# Patient Record
Sex: FEMALE | Race: WHITE | Employment: FULL TIME | URBAN - METROPOLITAN AREA
[De-identification: names, ages, dates, MRNs, and addresses within clinical notes are randomized per-mention and may not be internally consistent; named-entity substitution may affect disease eponyms.]

---

## 2022-02-10 LAB
ANTIBODY SCREEN, EXTERNAL: NEGATIVE
CHLAMYDIA, EXTERNAL: NEGATIVE
GTT, FASTING, EXTERNAL: NORMAL
HBSAG, EXTERNAL: NEGATIVE
HGB, EXTERNAL: 13.9
HIV, EXTERNAL: NEGATIVE
N. GONORRHEA, EXTERNAL: NEGATIVE
NIPT, EXTERNAL: NORMAL
PAP SMEAR, EXTERNAL: NORMAL
RPR, EXTERNAL: NON REACTIVE
RUBELLA, EXTERNAL: NORMAL
TSH SERPL-ACNC: NORMAL M[IU]/L
TYPE, ABO & RH, EXTERNAL: NORMAL

## 2022-03-13 PROBLEM — E55.9 VITAMIN D DEFICIENCY: Status: ACTIVE | Noted: 2022-02-10

## 2022-03-15 ENCOUNTER — INITIAL PRENATAL (OUTPATIENT)
Dept: OBGYN CLINIC | Age: 32
End: 2022-03-15
Payer: COMMERCIAL

## 2022-03-15 VITALS — WEIGHT: 187.2 LBS | BODY MASS INDEX: 30.21 KG/M2 | DIASTOLIC BLOOD PRESSURE: 82 MMHG | SYSTOLIC BLOOD PRESSURE: 128 MMHG

## 2022-03-15 DIAGNOSIS — Z3A.16 PREGNANCY WITH 16 COMPLETED WEEKS GESTATION: ICD-10-CM

## 2022-03-15 DIAGNOSIS — Z34.90 ENCOUNTER FOR SUPERVISION OF NORMAL PREGNANCY, ANTEPARTUM, UNSPECIFIED GRAVIDITY: Primary | ICD-10-CM

## 2022-03-15 PROCEDURE — 0502F SUBSEQUENT PRENATAL CARE: CPT | Performed by: OBSTETRICS & GYNECOLOGY

## 2022-03-15 NOTE — PROGRESS NOTES
Doing well . Tx from [address] [place] [address]. No complaints.   Fetal moveent  Problems  Dating by LMP=1st Trimester US  Vitamin D Def  History of Gestation HTN G1  Boy -- Anjel Hooper

## 2022-03-17 LAB
AFP INTERP SERPL-IMP: NORMAL
AFP INTERP SERPL-IMP: NORMAL
AFP MOM SERPL: 1.16
AFP SERPL-MCNC: 34 NG/ML
AGE AT DELIVERY: 32.3 YR
COMMENT, 018013: NORMAL
GA METHOD: NORMAL
GA: 16 WEEKS
IDDM PATIENT QL: NORMAL
MULTIPLE PREGNANCY: NO
NEURAL TUBE DEFECT RISK FETUS: 7366 %
RESULTS, 017004: NORMAL
SPECIMEN STATUS REPORT, ROLRST: NORMAL

## 2022-03-19 PROBLEM — E55.9 VITAMIN D DEFICIENCY: Status: ACTIVE | Noted: 2022-02-10

## 2022-04-05 ENCOUNTER — ROUTINE PRENATAL (OUTPATIENT)
Dept: OBGYN CLINIC | Age: 32
End: 2022-04-05

## 2022-04-05 VITALS — WEIGHT: 185.4 LBS | SYSTOLIC BLOOD PRESSURE: 110 MMHG | DIASTOLIC BLOOD PRESSURE: 70 MMHG | BODY MASS INDEX: 29.92 KG/M2

## 2022-04-05 DIAGNOSIS — Z3A.19 PREGNANCY WITH 19 COMPLETED WEEKS GESTATION: Primary | ICD-10-CM

## 2022-04-05 PROCEDURE — 0502F SUBSEQUENT PRENATAL CARE: CPT | Performed by: OBSTETRICS & GYNECOLOGY

## 2022-04-05 RX ORDER — ONDANSETRON 8 MG/1
TABLET, ORALLY DISINTEGRATING ORAL
COMMUNITY
Start: 2022-02-10 | End: 2022-06-16 | Stop reason: ALTCHOICE

## 2022-04-05 RX ORDER — PROMETHAZINE HYDROCHLORIDE 25 MG/1
TABLET ORAL
COMMUNITY
Start: 2022-01-17 | End: 2022-06-16 | Stop reason: ALTCHOICE

## 2022-04-05 NOTE — PROGRESS NOTES
Has lost 9.6 lb  US today growth 85% normal fetal scan  Still throwing UP lots of nausea. Taking   Promethazine/Zofran    Problems  Dating by LMP=1st Trimester US  Vitamin D Def  History of Gestation HTN G1 take Baby ASA  Boy -- Laura Speak  US 19w6d = 20w4d 85% poor views of RVOT and Foot ? EDISON renae 28w

## 2022-04-05 NOTE — PROGRESS NOTES
FETAL SURVEY  A SINGLE VIABLE IUP AT 19W6D GA BY LMP IS SEEN. FETAL CARDIAC MOTION OBSERVED. FETAL ANATOMY WELL VISUALIZED. FACE, NOSE/LIPS, PROFILE, CSP, LAT VENT, CER/CM, CP, SPINE, KIDNEYS, STOMACH/DIAPHRAGM, BLADDER,  CORD INSERTION, LVOT, 4CH, AO, DA, 3VV, ARMS, LEGS, HANDS, FEET. ANATOMY NOT SEEN: RVOT, FEET  THERE APPEARS TO BE A SLUA. 3VC IS NOT SEEN. APPROPRIATE FETAL GROWTH IS SEEN. SIZE=DATES. VIRI, CERVIX AND PLACENTA APPEAR WITHIN NORMAL LIMITS.   GENDER: XY

## 2022-04-18 ENCOUNTER — HOSPITAL ENCOUNTER (EMERGENCY)
Age: 32
Discharge: HOME | End: 2022-04-18
Attending: STUDENT IN AN ORGANIZED HEALTH CARE EDUCATION/TRAINING PROGRAM
Payer: COMMERCIAL

## 2022-04-18 ENCOUNTER — TELEPHONE (OUTPATIENT)
Dept: OBGYN CLINIC | Age: 32
End: 2022-04-18

## 2022-04-18 VITALS
HEIGHT: 67 IN | DIASTOLIC BLOOD PRESSURE: 71 MMHG | RESPIRATION RATE: 18 BRPM | HEART RATE: 111 BPM | OXYGEN SATURATION: 100 % | WEIGHT: 185 LBS | TEMPERATURE: 97.6 F | BODY MASS INDEX: 29.03 KG/M2 | SYSTOLIC BLOOD PRESSURE: 108 MMHG

## 2022-04-18 DIAGNOSIS — G43.019 INTRACTABLE MIGRAINE WITHOUT AURA AND WITHOUT STATUS MIGRAINOSUS: Primary | ICD-10-CM

## 2022-04-18 DIAGNOSIS — R51.9 ACUTE NONINTRACTABLE HEADACHE, UNSPECIFIED HEADACHE TYPE: Primary | ICD-10-CM

## 2022-04-18 LAB
ALBUMIN SERPL-MCNC: 2.5 G/DL (ref 3.5–5)
ALBUMIN/GLOB SERPL: 0.6 {RATIO} (ref 1.1–2.2)
ALP SERPL-CCNC: 99 U/L (ref 45–117)
ALT SERPL-CCNC: 12 U/L (ref 12–78)
ANION GAP SERPL CALC-SCNC: 4 MMOL/L (ref 5–15)
AST SERPL-CCNC: 11 U/L (ref 15–37)
BASOPHILS # BLD: 0 K/UL (ref 0–0.1)
BASOPHILS NFR BLD: 0 % (ref 0–1)
BILIRUB SERPL-MCNC: 0.2 MG/DL (ref 0.2–1)
BUN SERPL-MCNC: 7 MG/DL (ref 6–20)
BUN/CREAT SERPL: 10 (ref 12–20)
CALCIUM SERPL-MCNC: 8.8 MG/DL (ref 8.5–10.1)
CHLORIDE SERPL-SCNC: 109 MMOL/L (ref 97–108)
CO2 SERPL-SCNC: 23 MMOL/L (ref 21–32)
COMMENT, HOLDF: NORMAL
CREAT SERPL-MCNC: 0.69 MG/DL (ref 0.55–1.02)
DIFFERENTIAL METHOD BLD: ABNORMAL
EOSINOPHIL # BLD: 0.6 K/UL (ref 0–0.4)
EOSINOPHIL NFR BLD: 6 % (ref 0–7)
ERYTHROCYTE [DISTWIDTH] IN BLOOD BY AUTOMATED COUNT: 12.8 % (ref 11.5–14.5)
GLOBULIN SER CALC-MCNC: 4.4 G/DL (ref 2–4)
GLUCOSE SERPL-MCNC: 92 MG/DL (ref 65–100)
HCT VFR BLD AUTO: 35.1 % (ref 35–47)
HGB BLD-MCNC: 11.9 G/DL (ref 11.5–16)
IMM GRANULOCYTES # BLD AUTO: 0 K/UL (ref 0–0.04)
IMM GRANULOCYTES NFR BLD AUTO: 0 % (ref 0–0.5)
LYMPHOCYTES # BLD: 1.4 K/UL (ref 0.8–3.5)
LYMPHOCYTES NFR BLD: 14 % (ref 12–49)
MCH RBC QN AUTO: 29.5 PG (ref 26–34)
MCHC RBC AUTO-ENTMCNC: 33.9 G/DL (ref 30–36.5)
MCV RBC AUTO: 86.9 FL (ref 80–99)
MONOCYTES # BLD: 0.5 K/UL (ref 0–1)
MONOCYTES NFR BLD: 5 % (ref 5–13)
NEUTS SEG # BLD: 7.6 K/UL (ref 1.8–8)
NEUTS SEG NFR BLD: 75 % (ref 32–75)
NRBC # BLD: 0 K/UL (ref 0–0.01)
NRBC BLD-RTO: 0 PER 100 WBC
PLATELET # BLD AUTO: 291 K/UL (ref 150–400)
PMV BLD AUTO: 9.6 FL (ref 8.9–12.9)
POTASSIUM SERPL-SCNC: 3.8 MMOL/L (ref 3.5–5.1)
PROT SERPL-MCNC: 6.9 G/DL (ref 6.4–8.2)
RBC # BLD AUTO: 4.04 M/UL (ref 3.8–5.2)
SAMPLES BEING HELD,HOLD: NORMAL
SODIUM SERPL-SCNC: 136 MMOL/L (ref 136–145)
WBC # BLD AUTO: 10.1 K/UL (ref 3.6–11)

## 2022-04-18 PROCEDURE — 99284 EMERGENCY DEPT VISIT MOD MDM: CPT

## 2022-04-18 PROCEDURE — 36415 COLL VENOUS BLD VENIPUNCTURE: CPT

## 2022-04-18 PROCEDURE — 80053 COMPREHEN METABOLIC PANEL: CPT

## 2022-04-18 PROCEDURE — 96374 THER/PROPH/DIAG INJ IV PUSH: CPT

## 2022-04-18 PROCEDURE — 85025 COMPLETE CBC W/AUTO DIFF WBC: CPT

## 2022-04-18 PROCEDURE — 74011250636 HC RX REV CODE- 250/636: Performed by: STUDENT IN AN ORGANIZED HEALTH CARE EDUCATION/TRAINING PROGRAM

## 2022-04-18 RX ORDER — METOCLOPRAMIDE HYDROCHLORIDE 5 MG/ML
10 INJECTION INTRAMUSCULAR; INTRAVENOUS
Status: COMPLETED | OUTPATIENT
Start: 2022-04-18 | End: 2022-04-18

## 2022-04-18 RX ORDER — BUTALBITAL, ACETAMINOPHEN AND CAFFEINE 50; 325; 40 MG/1; MG/1; MG/1
1 TABLET ORAL
Qty: 20 TABLET | Refills: 0 | Status: SHIPPED | OUTPATIENT
Start: 2022-04-18 | End: 2022-04-25

## 2022-04-18 RX ADMIN — METOCLOPRAMIDE 10 MG: 5 INJECTION, SOLUTION INTRAMUSCULAR; INTRAVENOUS at 07:57

## 2022-04-18 RX ADMIN — SODIUM CHLORIDE 1000 ML: 9 INJECTION, SOLUTION INTRAVENOUS at 07:57

## 2022-04-18 NOTE — TELEPHONE ENCOUNTER
Message  Received: Today  MD Sherrell Patel Derry Samuels, RN  Caller: Unspecified (Today, 11:36 AM)  I sent her some fioricette   Patient advised of MD sent prescription and to follow up if no improvements    Patient verbalized understanding.

## 2022-04-18 NOTE — ED PROVIDER NOTES
Chief Complaint   Patient presents with    Migraine     This is a 60-year-old female currently 21 weeks pregnant by last ultrasound, with a pattern of migraine headaches in the past, presenting with headache which she localizes to her left frontal and parietal area that started 3 days ago. She says she woke up with a headache, it feels like migraines she has had in the past but has lasted longer than usual.  Prior to her pregnancy she was taking sumatriptan with some relief. Feels nauseous and dizzy, endorses photophobia. Otherwise denies any loss of vision, neck pain, vomiting, speech deficit, lateralizing weakness or sensory changes in her extremities, and she has been walking without any difficulty. No antecedent head trauma. Denies any vaginal bleeding, leakage of fluid, or loss of fetal movement. She is being followed by [facility] and has not had any complications related to her pregnancy thus far. Using Tylenol and Benadryl at home without any relief, no medications taken today. Symptoms are moderate in nature without any alleviating factors.       Past Medical History:   Diagnosis Date    Anemia     Celiac disease     COVID-19 12/25/2021    PIH (pregnancy induced hypertension)     Vitamin D deficiency 02/10/2022    17.5       Past Surgical History:   Procedure Laterality Date    HX ENDOSCOPY      HX TONSILLECTOMY           Family History:   Problem Relation Age of Onset    Other Mother         Fibroids    Breast Cancer Maternal Grandmother     Ovarian Cancer Maternal Grandmother        Social History     Socioeconomic History    Marital status:      Spouse name: Not on file    Number of children: Not on file    Years of education: Not on file    Highest education level: Not on file   Occupational History    Occupation: Marketing   Tobacco Use    Smoking status: Never Smoker    Smokeless tobacco: Never Used   Vaping Use    Vaping Use: Never used   Substance and Sexual Activity    Alcohol use: Not Currently    Drug use: Never    Sexual activity: Yes     Partners: Male     Birth control/protection: None   Other Topics Concern    Not on file   Social History Narrative    Not on file     Social Determinants of Health     Financial Resource Strain:     Difficulty of Paying Living Expenses: Not on file   Food Insecurity:     Worried About Running Out of Food in the Last Year: Not on file    [place] of Food in the Last Year: Not on file   Transportation Needs:     Lack of Transportation (Medical): Not on file    Lack of Transportation (Non-Medical): Not on file   Physical Activity:     Days of Exercise per Week: Not on file    Minutes of Exercise per Session: Not on file   Stress:     Feeling of Stress : Not on file   Social Connections:     Frequency of Communication with Friends and Family: Not on file    Frequency of Social Gatherings with Friends and Family: Not on file    Attends [employer] Services: Not on file    Active Member of Clubs or Organizations: Not on file    Attends Club or Organization Meetings: Not on file    Marital Status: Not on file   Intimate Partner Violence:     Fear of Current or Ex-Partner: Not on file    Emotionally Abused: Not on file    Physically Abused: Not on file    Sexually Abused: Not on file   Housing Stability:     Unable to Pay for Housing in the Last Year: Not on file    Number of [place] in the Last Year: Not on file    Unstable Housing in the Last Year: Not on file         ALLERGIES: Gluten    Review of Systems   Constitutional: Negative for fever. HENT: Negative for ear pain. Eyes: Positive for photophobia. Respiratory: Negative for shortness of breath. Cardiovascular: Negative for chest pain. Gastrointestinal: Negative for abdominal pain and vomiting. Genitourinary: Negative for pelvic pain. Musculoskeletal: Negative for neck pain. Neurological: Positive for dizziness and headaches.  Negative for weakness and numbness. Psychiatric/Behavioral: Negative for confusion. Vitals:    04/18/22 0723   BP: 132/79   Pulse: (!) 111   Resp: 18   Temp: 97.6 °F (36.4 °C)   SpO2: 100%   Weight: 83.9 kg (185 lb)   Height: 5' 7\" (1.702 m)            Physical Exam  General:  Awake and alert, NAD  HEENT:  NC/AT, equal pupils, moist mucous membranes, TM's clear without bulging or erythema  Neck:   Normal inspection, full range of motion  Cardiac:  RRR, no murmurs  Respiratory:  Clear bilaterally, no wheezes, rales, rhonchi  Abdomen:  Soft and gravid, nontender  Extremities: Warm and well perfused, no peripheral edema  Neuro:  CN grossly intact, moving all extremities symmetrically without gross motor deficit, normal gait without ataxia  Skin:   No rashes or pallor    RESULTS  Recent Results (from the past 12 hour(s))   CBC WITH AUTOMATED DIFF    Collection Time: 04/18/22  7:56 AM   Result Value Ref Range    WBC 10.1 3.6 - 11.0 K/uL    RBC 4.04 3.80 - 5.20 M/uL    HGB 11.9 11.5 - 16.0 g/dL    HCT 35.1 35.0 - 47.0 %    MCV 86.9 80.0 - 99.0 FL    MCH 29.5 26.0 - 34.0 PG    MCHC 33.9 30.0 - 36.5 g/dL    RDW 12.8 11.5 - 14.5 %    PLATELET 410 883 - 472 K/uL    MPV 9.6 8.9 - 12.9 FL    NRBC 0.0 0  WBC    ABSOLUTE NRBC 0.00 0.00 - 0.01 K/uL    NEUTROPHILS 75 32 - 75 %    LYMPHOCYTES 14 12 - 49 %    MONOCYTES 5 5 - 13 %    EOSINOPHILS 6 0 - 7 %    BASOPHILS 0 0 - 1 %    IMMATURE GRANULOCYTES 0 0.0 - 0.5 %    ABS. NEUTROPHILS 7.6 1.8 - 8.0 K/UL    ABS. LYMPHOCYTES 1.4 0.8 - 3.5 K/UL    ABS. MONOCYTES 0.5 0.0 - 1.0 K/UL    ABS. EOSINOPHILS 0.6 (H) 0.0 - 0.4 K/UL    ABS. BASOPHILS 0.0 0.0 - 0.1 K/UL    ABS. IMM.  GRANS. 0.0 0.00 - 0.04 K/UL    DF AUTOMATED     METABOLIC PANEL, COMPREHENSIVE    Collection Time: 04/18/22  7:56 AM   Result Value Ref Range    Sodium 136 136 - 145 mmol/L    Potassium 3.8 3.5 - 5.1 mmol/L    Chloride 109 (H) 97 - 108 mmol/L    CO2 23 21 - 32 mmol/L    Anion gap 4 (L) 5 - 15 mmol/L    Glucose 92 65 - 100 mg/dL    BUN 7 6 - 20 MG/DL    Creatinine 0.69 0.55 - 1.02 MG/DL    BUN/Creatinine ratio 10 (L) 12 - 20      GFR est AA >60 >60 ml/min/1.73m2    GFR est non-AA >60 >60 ml/min/1.73m2    Calcium 8.8 8.5 - 10.1 MG/DL    Bilirubin, total 0.2 0.2 - 1.0 MG/DL    ALT (SGPT) 12 12 - 78 U/L    AST (SGOT) 11 (L) 15 - 37 U/L    Alk. phosphatase 99 45 - 117 U/L    Protein, total 6.9 6.4 - 8.2 g/dL    Albumin 2.5 (L) 3.5 - 5.0 g/dL    Globulin 4.4 (H) 2.0 - 4.0 g/dL    A-G Ratio 0.6 (L) 1.1 - 2.2     SAMPLES BEING HELD    Collection Time: 04/18/22  7:56 AM   Result Value Ref Range    SAMPLES BEING HELD 1SST,1 DRK GRN     COMMENT        Add-on orders for these samples will be processed based on acceptable specimen integrity and analyte stability, which may vary by analyte. IMAGING  No results found. Procedures - none unless documented below    ED course: Labs reviewed. . After receiving IV fluids and Reglan she's feeling much better, headache is resolving. Given hx and exam, reassessment, low suspicion at this moment in time for [facility], meningitis/encephalitis, cervical dissection, venous sinus thrombosis or other emergent intracranial pathology. Repeat BP after treatment is 108/71. She feels comfortable going home and following up with her obstetrician to be reevaluated. Strict return precautions communicated. The patient has been given verbal and written advice regarding today's presentation including reasons to re-attend, specifically signs and symptoms of concern or worsening condition were discussed and understood by the patient.      Impression: Acute headache  Disposition: Discharge home

## 2022-04-18 NOTE — DISCHARGE INSTRUCTIONS
- Continue Tylenol as needed for headache, drink lots of fluids throughout the day, eat regular meals, get adequate sleep  - Return immediately to the ER for severe or worsening headache, loss of vision, neck pain, vomiting, weakness in an arm or a leg, confusion or decreased responsiveness, dizziness, or difficulty walking.

## 2022-04-18 NOTE — TELEPHONE ENCOUNTER
Call received raymond 11:35am      32year old last seen in the office on  last seen in the office on 2022. Patient reports baby is doing well and moving and she is not having contractions.       Patient was seen in er today ( see notes)for migraine that started on Friday and was provided medication but the migraine is back    Pharmacy confirmed      Please advise    Thank you

## 2022-04-22 ENCOUNTER — TELEPHONE (OUTPATIENT)
Dept: OBGYN CLINIC | Age: 32
End: 2022-04-22

## 2022-04-22 NOTE — TELEPHONE ENCOUNTER
Call received at 9:00am      32year old  22w2d pregnant last seen in the office on 2022       GM patient       Patient denies vaginal bleeding,ROM, contractions and reports positive fetal movement      Patient calling to say that for the past two weeks she has had a dry cough, denies runny nose or fever    Patient reports the coughing is causing tightness in her abdomen and is wondering what she can take    Patient was advised she can take Robitussin plain or DM , patient reports she has tried that and it does not help    Patient was advised to increase po fluids and to seek evaluation at her PcP      Additional recommendations    Please advise    Thank you

## 2022-04-22 NOTE — TELEPHONE ENCOUNTER
Patient advised of work in MD recommendations and has tried the suggestions and is on the way to her PCP    Patient verbalized understanding.

## 2022-05-10 ENCOUNTER — ROUTINE PRENATAL (OUTPATIENT)
Dept: OBGYN CLINIC | Age: 32
End: 2022-05-10
Payer: COMMERCIAL

## 2022-05-10 VITALS — BODY MASS INDEX: 29.63 KG/M2 | SYSTOLIC BLOOD PRESSURE: 138 MMHG | DIASTOLIC BLOOD PRESSURE: 80 MMHG | WEIGHT: 189.2 LBS

## 2022-05-10 DIAGNOSIS — Z3A.24 24 WEEKS GESTATION OF PREGNANCY: Primary | ICD-10-CM

## 2022-05-10 PROCEDURE — 0502F SUBSEQUENT PRENATAL CARE: CPT | Performed by: OBSTETRICS & GYNECOLOGY

## 2022-05-10 NOTE — PROGRESS NOTES
Doing well No issues  Some elevated BP at home but no Sx Pre E. No Edema.        Problems  Dating by LMP=1st Trimester US  Vitamin D Def  History of Gestation HTN G1 taking Baby ASA  Boy -- Mercedes Arrant  US 19w6d = 20w4d 85% poor views of RVOT and Foot ? 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

## 2022-05-31 ENCOUNTER — ROUTINE PRENATAL (OUTPATIENT)
Dept: OBGYN CLINIC | Age: 32
End: 2022-05-31

## 2022-05-31 VITALS — SYSTOLIC BLOOD PRESSURE: 139 MMHG | WEIGHT: 192.2 LBS | BODY MASS INDEX: 30.1 KG/M2 | DIASTOLIC BLOOD PRESSURE: 93 MMHG

## 2022-05-31 DIAGNOSIS — Z3A.27 27 WEEKS GESTATION OF PREGNANCY: Primary | ICD-10-CM

## 2022-05-31 DIAGNOSIS — R03.0 ELEVATED BP WITHOUT DIAGNOSIS OF HYPERTENSION: ICD-10-CM

## 2022-05-31 DIAGNOSIS — Z23 ENCOUNTER FOR IMMUNIZATION: ICD-10-CM

## 2022-05-31 PROBLEM — Q27.0 SINGLE UMBILICAL ARTERY: Status: ACTIVE | Noted: 2022-05-31

## 2022-05-31 PROCEDURE — 90715 TDAP VACCINE 7 YRS/> IM: CPT | Performed by: OBSTETRICS & GYNECOLOGY

## 2022-05-31 PROCEDURE — 0502F SUBSEQUENT PRENATAL CARE: CPT | Performed by: OBSTETRICS & GYNECOLOGY

## 2022-05-31 PROCEDURE — 90471 IMMUNIZATION ADMIN: CPT | Performed by: OBSTETRICS & GYNECOLOGY

## 2022-05-31 NOTE — PROGRESS NOTES
After obtaining consent, and per orders of , injection of Tdap given by Kian Gabriel. Patient instructed to remain in clinic for 10 minutes afterwards, and to report any adverse reaction to me immediately.

## 2022-05-31 NOTE — PROGRESS NOTES
Doing well. BP noted. No specific sx Pre E. Checking BP at home: Occ high bp but usually 110/75  No swelling no HA. Rare scotoma. Still w occ nasuea still taking zofran occ. LIMITED OB SCAN  A SINGLE BREECH 27W6D IUP IS SEEN. FETAL CARDIAC MOTION OBSERVED. LIMITED ANATOMY WAS VISUALIZED. FEET AND RVOT APPEARS WNL. THE 2VC SEEN PREVIOUSLY APPEARS TO BE SEEN TODAY.  RECOMMENDED FOR FOLLOW UP.  APPROPRIATE FETAL GROWTH IS SEEN. SIZE=DATES. VIRI AND PLACENTA APPEAR WITHIN NORMAL LIMITS. Problems  Dating by LMP=1st Trimester US  Vitamin D Def  History of Gestation HTN G1 taking Baby ASA  Boy -- Myrene Salon  US 19w6d = 20w4d 85% poor views of RVOT and Foot ? SUA rescan 28w -- Has appt   US 27w6d = 27w5d 43% [place].   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

## 2022-06-01 LAB
25(OH)D3 SERPL-MCNC: 25.4 NG/ML (ref 30–100)
ALBUMIN SERPL-MCNC: 2.7 G/DL (ref 3.5–5)
ALBUMIN/GLOB SERPL: 0.7 {RATIO} (ref 1.1–2.2)
ALP SERPL-CCNC: 107 U/L (ref 45–117)
ALT SERPL-CCNC: 13 U/L (ref 12–78)
ANION GAP SERPL CALC-SCNC: 8 MMOL/L (ref 5–15)
AST SERPL-CCNC: 11 U/L (ref 15–37)
BILIRUB SERPL-MCNC: 0.1 MG/DL (ref 0.2–1)
BUN SERPL-MCNC: 7 MG/DL (ref 6–20)
BUN/CREAT SERPL: 11 (ref 12–20)
CALCIUM SERPL-MCNC: 9.1 MG/DL (ref 8.5–10.1)
CHLORIDE SERPL-SCNC: 106 MMOL/L (ref 97–108)
CO2 SERPL-SCNC: 22 MMOL/L (ref 21–32)
CREAT SERPL-MCNC: 0.62 MG/DL (ref 0.55–1.02)
ERYTHROCYTE [DISTWIDTH] IN BLOOD BY AUTOMATED COUNT: 13.5 % (ref 11.5–14.5)
GLOBULIN SER CALC-MCNC: 3.8 G/DL (ref 2–4)
GLUCOSE 1H P 100 G GLC PO SERPL-MCNC: 122 MG/DL (ref 65–140)
GLUCOSE SERPL-MCNC: 118 MG/DL (ref 65–100)
HCT VFR BLD AUTO: 38.2 % (ref 35–47)
HGB BLD-MCNC: 11.7 G/DL (ref 11.5–16)
MCH RBC QN AUTO: 29.3 PG (ref 26–34)
MCHC RBC AUTO-ENTMCNC: 30.6 G/DL (ref 30–36.5)
MCV RBC AUTO: 95.7 FL (ref 80–99)
NRBC # BLD: 0 K/UL (ref 0–0.01)
NRBC BLD-RTO: 0 PER 100 WBC
PLATELET # BLD AUTO: 315 K/UL (ref 150–400)
PMV BLD AUTO: 9.6 FL (ref 8.9–12.9)
POTASSIUM SERPL-SCNC: 3.8 MMOL/L (ref 3.5–5.1)
PROT SERPL-MCNC: 6.5 G/DL (ref 6.4–8.2)
RBC # BLD AUTO: 3.99 M/UL (ref 3.8–5.2)
SODIUM SERPL-SCNC: 136 MMOL/L (ref 136–145)
WBC # BLD AUTO: 7.7 K/UL (ref 3.6–11)

## 2022-06-05 ENCOUNTER — HOSPITAL ENCOUNTER (EMERGENCY)
Age: 32
Discharge: HOME | End: 2022-06-05
Attending: OBSTETRICS & GYNECOLOGY | Admitting: OBSTETRICS & GYNECOLOGY
Payer: COMMERCIAL

## 2022-06-05 VITALS
OXYGEN SATURATION: 98 % | TEMPERATURE: 98.4 F | SYSTOLIC BLOOD PRESSURE: 138 MMHG | DIASTOLIC BLOOD PRESSURE: 88 MMHG | HEART RATE: 94 BPM | RESPIRATION RATE: 16 BRPM

## 2022-06-05 LAB
ALBUMIN SERPL-MCNC: 2.5 G/DL (ref 3.5–5)
ALBUMIN/GLOB SERPL: 0.7 {RATIO} (ref 1.1–2.2)
ALP SERPL-CCNC: 104 U/L (ref 45–117)
ALT SERPL-CCNC: 13 U/L (ref 12–78)
ANION GAP SERPL CALC-SCNC: 8 MMOL/L (ref 5–15)
AST SERPL-CCNC: 9 U/L (ref 15–37)
BASOPHILS # BLD: 0 K/UL (ref 0–0.1)
BASOPHILS NFR BLD: 0 % (ref 0–1)
BILIRUB SERPL-MCNC: 0.2 MG/DL (ref 0.2–1)
BUN SERPL-MCNC: 8 MG/DL (ref 6–20)
BUN/CREAT SERPL: 12 (ref 12–20)
CALCIUM SERPL-MCNC: 8.9 MG/DL (ref 8.5–10.1)
CHLORIDE SERPL-SCNC: 108 MMOL/L (ref 97–108)
CO2 SERPL-SCNC: 22 MMOL/L (ref 21–32)
CREAT SERPL-MCNC: 0.69 MG/DL (ref 0.55–1.02)
CREAT UR-MCNC: <13 MG/DL
DIFFERENTIAL METHOD BLD: ABNORMAL
EOSINOPHIL # BLD: 0.4 K/UL (ref 0–0.4)
EOSINOPHIL NFR BLD: 4 % (ref 0–7)
ERYTHROCYTE [DISTWIDTH] IN BLOOD BY AUTOMATED COUNT: 13.1 % (ref 11.5–14.5)
GLOBULIN SER CALC-MCNC: 3.8 G/DL (ref 2–4)
GLUCOSE SERPL-MCNC: 90 MG/DL (ref 65–100)
HCT VFR BLD AUTO: 32.1 % (ref 35–47)
HGB BLD-MCNC: 11.1 G/DL (ref 11.5–16)
IMM GRANULOCYTES # BLD AUTO: 0 K/UL (ref 0–0.04)
IMM GRANULOCYTES NFR BLD AUTO: 0 % (ref 0–0.5)
LDH SERPL L TO P-CCNC: 119 U/L (ref 81–246)
LYMPHOCYTES # BLD: 1.3 K/UL (ref 0.8–3.5)
LYMPHOCYTES NFR BLD: 12 % (ref 12–49)
MCH RBC QN AUTO: 29.7 PG (ref 26–34)
MCHC RBC AUTO-ENTMCNC: 34.6 G/DL (ref 30–36.5)
MCV RBC AUTO: 85.8 FL (ref 80–99)
MONOCYTES # BLD: 0.6 K/UL (ref 0–1)
MONOCYTES NFR BLD: 5 % (ref 5–13)
NEUTS SEG # BLD: 8 K/UL (ref 1.8–8)
NEUTS SEG NFR BLD: 79 % (ref 32–75)
NRBC # BLD: 0 K/UL (ref 0–0.01)
NRBC BLD-RTO: 0 PER 100 WBC
PLATELET # BLD AUTO: 289 K/UL (ref 150–400)
PMV BLD AUTO: 9.4 FL (ref 8.9–12.9)
POTASSIUM SERPL-SCNC: 3.8 MMOL/L (ref 3.5–5.1)
PROT SERPL-MCNC: 6.3 G/DL (ref 6.4–8.2)
PROT UR-MCNC: <5 MG/DL (ref 0–11.9)
PROT/CREAT UR-RTO: NORMAL
RBC # BLD AUTO: 3.74 M/UL (ref 3.8–5.2)
SODIUM SERPL-SCNC: 138 MMOL/L (ref 136–145)
WBC # BLD AUTO: 10.2 K/UL (ref 3.6–11)

## 2022-06-05 PROCEDURE — 36415 COLL VENOUS BLD VENIPUNCTURE: CPT

## 2022-06-05 PROCEDURE — 84156 ASSAY OF PROTEIN URINE: CPT

## 2022-06-05 PROCEDURE — 80053 COMPREHEN METABOLIC PANEL: CPT

## 2022-06-05 PROCEDURE — 85025 COMPLETE CBC W/AUTO DIFF WBC: CPT

## 2022-06-05 PROCEDURE — 83615 LACTATE (LD) (LDH) ENZYME: CPT

## 2022-06-05 RX ORDER — GUAIFENESIN 100 MG/5ML
81 LIQUID (ML) ORAL DAILY
COMMUNITY
End: 2022-08-03

## 2022-06-05 NOTE — ED PROVIDER NOTES
[facility] Evaluation    Name: Shannon Meraz MRN: [id number]  SSN: [id number]    YOB: 1990  Age: 28 y.o. Sex: female      Subjective:     Reason for Admission:  Pregnancy and elevated BP    History of Present Illness: Shannon Mreaz is a 28 y.o.  female  with an estimated gestational age of 33w3d with Estimated Date of Delivery: 22. Patient complains of elevated BPs at home. She had a mild 3/10 intermittent headache and suffers with chronic migraines. Presently she does not feel the headache at this time. Denied scotomata, contractions, PROM or vaginal bleeding. OB History        3    Para   1    Term   1            AB   1    Living   1       SAB   1    IAB        Ectopic        Molar        Multiple        Live Births   1              Past Medical History:   Diagnosis Date    Anemia     Celiac disease     COVID-19 2021    Gestational hypertension     with 1st pregnancy    PIH (pregnancy induced hypertension)     Vitamin D deficiency 02/10/2022    17.5     Past Surgical History:   Procedure Laterality Date    HX ENDOSCOPY      HX TONSILLECTOMY       Social History     Occupational History    Occupation: Marketing   Tobacco Use    Smoking status: Never Smoker    Smokeless tobacco: Never Used   Vaping Use    Vaping Use: Never used   Substance and Sexual Activity    Alcohol use: Not Currently    Drug use: Never    Sexual activity: Yes     Partners: Male     Birth control/protection: None     Family History   Problem Relation Age of Onset    Other Mother         Fibroids    Breast Cancer Maternal Grandmother     Ovarian Cancer Maternal Grandmother        Allergies   Allergen Reactions    Gluten Nausea and Vomiting     Prior to Admission medications    Medication Sig Start Date End Date Taking? Authorizing Provider   prenatal vit-calcium-iron-fa (PRENATAL PLUS with CALCIUM) 27 mg iron- 1 mg tab Take 1 Tablet by mouth daily.    Yes Provider, Historical   aspirin 81 mg chewable tablet Take 81 mg by mouth daily. Yes Provider, Historical   ondansetron (ZOFRAN ODT) 4 mg disintegrating tablet Take 4-8 mg by mouth every four (4) hours as needed for Nausea. Yes Provider, Historical   promethazine (PHENERGAN) 25 mg tablet TAKE 1 TABLET BY MOUTH EVERY 6 HOURS AS NEEDED FOR 30 DAYS  Patient not taking: Reported on 2022   Provider, Historical   ondansetron (ZOFRAN ODT) 8 mg disintegrating tablet DISSOLVE 1 TABLET BY MOUTH EVERY 8 HOURS FOR 30 DAYS  Patient not taking: Reported on 2022 2/10/22   Provider, Historical   Dexlansoprazole 60 mg CpDB Take 1 Cap by mouth daily. Patient not taking: Reported on 2022    Provider, Historical   ranitidine (ZANTAC) 150 mg tablet Take 150 mg by mouth two (2) times daily as needed for Indigestion. Patient not taking: Reported on 2022    Provider, Historical   HYDROcodone-acetaminophen (NORCO) 5-325 mg per tablet Take 1-2 Tabs by mouth every four (4) hours as needed for Pain. Patient not taking: Reported on 2022    Provider, Historical   sucralfate (CARAFATE) 100 mg/mL suspension Take 10 mL by mouth four (4) times daily.   Patient not taking: Reported on 2022 6/15/16   Elder Perez NP        Review of Systems    Objective:     Vitals:    Vitals:    22 1248 22 1303 22 1311 22 1318   BP: 121/79 (!) 140/85  138/88   Pulse: 88 96  94   Resp:       Temp:       SpO2:   98%       Temp (24hrs), Av.4 °F (36.9 °C), Min:98.4 °F (36.9 °C), Max:98.4 °F (36.9 °C)    BP  Min: 121/79  Max: 140/85     Physical Exam    Cervical Exam: not checked  Uterine Activity: None  Membranes: Intact  Fetal Heart Rate: Reactive       Labs:   Recent Results (from the past 24 hour(s))   METABOLIC PANEL, COMPREHENSIVE    Collection Time: 22 12:15 PM   Result Value Ref Range    Sodium 138 136 - 145 mmol/L    Potassium 3.8 3.5 - 5.1 mmol/L    Chloride 108 97 - 108 mmol/L    CO2 22 21 - 32 mmol/L    Anion gap 8 5 - 15 mmol/L    Glucose 90 65 - 100 mg/dL    BUN 8 6 - 20 MG/DL    Creatinine 0.69 0.55 - 1.02 MG/DL    BUN/Creatinine ratio 12 12 - 20      GFR est AA >60 >60 ml/min/1.73m2    GFR est non-AA >60 >60 ml/min/1.73m2    Calcium 8.9 8.5 - 10.1 MG/DL    Bilirubin, total 0.2 0.2 - 1.0 MG/DL    ALT (SGPT) 13 12 - 78 U/L    AST (SGOT) 9 (L) 15 - 37 U/L    Alk. phosphatase 104 45 - 117 U/L    Protein, total 6.3 (L) 6.4 - 8.2 g/dL    Albumin 2.5 (L) 3.5 - 5.0 g/dL    Globulin 3.8 2.0 - 4.0 g/dL    A-G Ratio 0.7 (L) 1.1 - 2.2     LD    Collection Time: 06/05/22 12:15 PM   Result Value Ref Range     81 - 246 U/L   PROTEIN/CREATININE RATIO, URINE    Collection Time: 06/05/22 12:19 PM   Result Value Ref Range    Protein, urine random <5 0.0 - 11.9 mg/dL    Creatinine, urine <13.00 mg/dL    Protein/Creat. 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 History preeclampsia last pregnancy. Hx of chronic migrains-does not have hypertensive headache    Plan: Discharge home and continue daily baby ASA. Repeat BP check tomorrow at Dr. Clinton Fonseca office.         Signed By:  Toi Tran MD     June 5, 2022

## 2022-06-05 NOTE — DISCHARGE INSTRUCTIONS
Patient Education   Patient Education   Patient Education        Preeclampsia: Care Instructions  Overview     Preeclampsia occurs when a woman's blood pressure rises during pregnancy. Often with preeclampsia, you also have swelling in your legs, hands, and face. A test may show too much protein in your urine. If preeclampsia is severe and not treated, it can lead to seizures (eclampsia) and damage to your liver or kidneys. Preeclampsia can prevent your baby from getting enough food and oxygen. This can cause a low birth weight or other problems. Your doctor will watch you closely to prevent these problems. Your doctor also may recommend that you reduce your activity. If your preeclampsia is a danger to your health or the health of your baby, your doctor may need to deliver your baby early. While preeclampsia is a concern, most women with preeclampsia have healthy babies. After a woman gives birth, preeclampsia usually goes away on its own. But symptoms may last a few weeks or more and can get worse after delivery. Rarely, symptoms of preeclampsia don't show up until days or even weeks after childbirth. Follow-up care is a key part of your treatment and safety. Be sure to make and go to all appointments, and call your doctor if you are having problems. It's also a good idea to know your test results and keep a list of the medicines you take. How can you care for yourself at home? · Take and record your blood pressure at home if your doctor tells you to. ? Ask your doctor to check your blood pressure monitor to be sure that it is accurate and that the cuff fits you. Also ask your doctor to watch you to make sure that you are using it right. ? You should not eat, use tobacco products, or use medicine known to raise blood pressure (such as some nasal decongestant sprays) before you take your blood pressure. ? Avoid taking your blood pressure if you have just exercised.  Also avoid taking it if you are nervous or upset. Rest at least 15 minutes before you take your blood pressure. · You may need to take medicine to manage your blood pressure. Take your medicines exactly as prescribed. Call your doctor if you think you are having a problem with your medicine. · Do not smoke. Quitting smoking will help improve your baby's growth and health. If you need help quitting, talk to your doctor about stop-smoking programs and medicines. These can increase your chances of quitting for good. · Eat a balanced and healthy diet that has lots of fruits and vegetables. · You can keep track of your baby's health by checking your baby's movement. A common method for this is to note the length of time it takes to count 10 movements (such as kicks, flutters, or rolls). Call your doctor if you don't feel at least 10 movements in a 2-hour period. Track your baby's movements once each day. Bring this record with you to each prenatal visit. When should you call for help? Share this information with your partner or a friend. They can help you watch for warning signs. Call 911  anytime you think you may need emergency care. For example, call if:    · You passed out (lost consciousness).     · You have a seizure. Call your doctor now or seek immediate medical care if:    · You have symptoms of preeclampsia, such as:  ? Sudden swelling of your face, hands, or feet. ? New vision problems (such as dimness, blurring, or seeing spots). ? A severe headache.     · Your blood pressure is very high, such as 160/110 or higher.     · Your blood pressure is higher than your doctor told you it should be, or it rises quickly.     · You have new nausea or vomiting.     · You think that you are in labor.     · You have pain in your belly or pelvis. Watch closely for changes in your health, and be sure to contact your doctor if:    · You gain weight rapidly. Where can you learn more?   Go to http://www.gray.com/  Enter Z954 in the search box to learn more about \"Preeclampsia: Care Instructions. \"  Current as of: 2021               Content Version: 13.2   [employer]. Care instructions adapted under license by [employer] (which disclaims liability or warranty for this information). If you have questions about a medical condition or this instruction, always ask your healthcare professional. [address] any warranty or liability for your use of this information. Pregnancy Precautions: Care Instructions  Your Care Instructions     There is no sure way to prevent labor before your due date ( labor) or to prevent most other pregnancy problems. But there are things you can do to increase your chances of a healthy pregnancy. Go to your appointments, follow your doctor's advice, and take good care of yourself. Eat well, and exercise (if your doctor agrees). And make sure to drink plenty of water. Follow-up care is a key part of your treatment and safety. Be sure to make and go to all appointments, and call your doctor if you are having problems. It's also a good idea to know your test results and keep a list of the medicines you take. How can you care for yourself at home? · Make sure you go to your prenatal appointments. At each visit, your doctor will check your blood pressure. Your doctor will also check to see if you have protein in your urine. High blood pressure and protein in urine are signs of preeclampsia. This condition can be dangerous for you and your baby. · Drink plenty of fluids. Dehydration can cause contractions. If you have kidney, heart, or liver disease and have to limit fluids, talk with your doctor before you increase the amount of fluids you drink. · Tell your doctor right away if you notice any symptoms of an infection, such as:  ? Burning when you urinate. ? A foul-smelling discharge from your vagina. ?  Vaginal itching. ? Unexplained fever. ? Unusual pain or soreness in your uterus or lower belly. · Eat a balanced diet. Include plenty of foods that are high in calcium and iron. ? Foods high in calcium include milk, cheese, yogurt, almonds, and broccoli. ? Foods high in iron include red meat, shellfish, poultry, eggs, beans, raisins, whole-grain bread, and leafy green vegetables. · Do not smoke. If you need help quitting, talk to your doctor about stop-smoking programs and medicines. These can increase your chances of quitting for good. · Do not drink alcohol or use marijuana or illegal drugs. · Follow your doctor's directions about activity. Your doctor will let you know how much, if any, exercise you can do. · Ask your doctor if you can have sex. If you are at risk for early labor, your doctor may ask you to not have sex. · Take care to prevent falls. During pregnancy, your joints are loose, and your balance is off. Sports such as bicycling, skiing, or in-line skating can increase your risk of falling. And don't ride horses or motorcycles, dive, water ski, scuba dive, or parachute jump while you are pregnant. · Avoid things that can make your body too hot and may be harmful to your baby, such as a hot tub or sauna. Or talk with your doctor before doing anything that raises your body temperature. Your doctor can tell you if it's safe. · Do not take any over-the-counter or herbal medicines or supplements without talking to your doctor or pharmacist first.  When should you call for help? Call 911  anytime you think you may need emergency care. For example, call if:    · You passed out (lost consciousness).     · You have a seizure.     · You have severe vaginal bleeding.     · You have severe pain in your belly or pelvis.     · You have had fluid gushing or leaking from your vagina and you know or think the umbilical cord is bulging into your vagina.  If this happens, immediately get down on your knees so your rear end (buttocks) is higher than your head. This will decrease the pressure on the cord until help arrives. Call your doctor now or seek immediate medical care if:    · You have signs of preeclampsia, such as:  ? Sudden swelling of your face, hands, or feet. ? New vision problems (such as dimness, blurring, or seeing spots). ? A severe headache.     · You have any vaginal bleeding.     · You have belly pain or cramping.     · You have a fever.     · You have had regular contractions (with or without pain) for an hour. This means that you have 8 or more within 1 hour or 4 or more in 20 minutes after you change your position and drink fluids.     · You have a sudden release of fluid from your vagina.     · You have low back pain or pelvic pressure that does not go away.     · You notice that your baby has stopped moving or is moving much less than normal.   Watch closely for changes in your health, and be sure to contact your doctor if you have any problems. Where can you learn more? Go to http://www.belle.com/  Enter Y951 in the search box to learn more about \"Pregnancy Precautions: Care Instructions. \"  Current as of: June 16, 2021               Content Version: 13.2  © 2006-2022 [employer]. Care instructions adapted under license by [employer] (which disclaims liability or warranty for this information). If you have questions about a medical condition or this instruction, always ask your healthcare professional. [place] [address] any warranty or liability for your use of this information. Counting Your Baby's Kicks: Care Instructions  Overview     Counting your baby's kicks is one way your doctor can tell that your baby is healthy. Most women--especially in a first pregnancy--feel their baby move for the first time between 16 and 22 weeks. The movement may feel like flutters rather than kicks.  Your baby may move more at certain times of the day. When you are active, you may notice less kicking than when you are resting. At your prenatal visits, your doctor will ask whether the baby is active. In your last trimester, your doctor may ask you to count the number of times you feel your baby move. Follow-up care is a key part of your treatment and safety. Be sure to make and go to all appointments, and call your doctor if you are having problems. It's also a good idea to know your test results and keep a list of the medicines you take. How do you count fetal kicks? · A common method of checking your baby's movement is to note the length of time it takes to count ten movements (such as kicks, flutters, or rolls). · Pick your baby's most active time of day to count. This may be any time from morning to evening. · If you don't feel 10 movements in an hour, have something to eat or drink and count for another hour. If you don't feel at least 10 movements in the 2-hour period, call your doctor. When should you call for help? Call your doctor now or seek immediate medical care if:    · You noticed that your baby has stopped moving or is moving much less than normal.   Watch closely for changes in your health, and be sure to contact your doctor if you have any problems. Where can you learn more? Go to http://www.gray.com/  Enter [id number] in the search box to learn more about \"Counting Your Baby's Kicks: Care Instructions. \"  Current as of: June 16, 2021               Content Version: 13.2  © 2006-2022 [email or web address]. Care instructions adapted under license by [employer] (which disclaims liability or warranty for this information). If you have questions about a medical condition or this instruction, always ask your healthcare professional. [address] any warranty or liability for your use of this information.

## 2022-06-05 NOTE — PROGRESS NOTES
1204:  at 28.4wks arrives with c/o having a headache since 0430 and elevated BP's at home of 130's/80's. Pt reports having a h/o GHTN with her first pregnancy. Pt reports regular fetal movement. 1323: Dr. Araceli Levy notified of pt's arrival, chief complaints, OB history, vital signs, and recent lab results. MD will evaluate patient. 1324: Dr. Araceli Levy at bedside evaluating patient. 1329: VORB from Dr. Araceli Levy to discharge pt home pending CBC results wnl.     1336: Written discharge instructions reviewed with pt. Pt educated on s/s to reports for preeclampsia. Pt advised to keep her regular scheduled appointment. Pt denies any additional questions or concerns. 1337: Pt left unit ambulatory in no signs of distress.

## 2022-06-06 ENCOUNTER — TELEPHONE (OUTPATIENT)
Dept: OBGYN CLINIC | Age: 32
End: 2022-06-06

## 2022-06-06 NOTE — TELEPHONE ENCOUNTER
Mychart, Generic Provider           Conversation: Blood pressure l and d check 22  (Oldest Message First)    Rigo SMITH  to Patient Medical Advice Request Pool          22 7:02 AM  Hello.     I called the on call line yesterday and they had me come in to check my blood pressure but I want to follow up with Dr. Blane Beckett.     Please give me a call at [phone].     Elizabeth Hutchins        28year old  28w1d pregnant     Patient was seen in the er yesterday on  ( see notes due to high Bp on 140/93, headache and feeling dizzy)    pateint reports BP today is 123/79 and she denies vaginal bleeding,ROM, contractions and reports positive fetal movement      Patient is wondering about going on BP meds like last time    Pharmacy confirmed     Please advise    Thankyou

## 2022-06-06 NOTE — TELEPHONE ENCOUNTER
Phoned patient to check on how she was feeling. Her BP early this morning 5:30am was 126/79. Her BP this afternoon 5:10pm was 125/90. She does not have a HA. Went over Dr. Bretta Scheuermann recommendations again. Informed patient if she has HA and BP gets higher contact office otherwise we will see her on 6/16/2022 and she will bring her BP log.

## 2022-06-06 NOTE — TELEPHONE ENCOUNTER
MD Sherrell Corral, Jilda Pica, RN  Caller: Unspecified (Today,  1:50 PM)  I reviewed her labs and all was fine.  Her Bps were borderline and nothing to stress about right now.  Lets have her come in next and bring bp log -- checking BP 2x/day while relaxed.       Patient advised of MD recommendations and verbalized understanding.

## 2022-06-16 ENCOUNTER — ROUTINE PRENATAL (OUTPATIENT)
Dept: OBGYN CLINIC | Age: 32
End: 2022-06-16
Payer: COMMERCIAL

## 2022-06-16 ENCOUNTER — PATIENT MESSAGE (OUTPATIENT)
Dept: OBGYN CLINIC | Age: 32
End: 2022-06-16

## 2022-06-16 VITALS — DIASTOLIC BLOOD PRESSURE: 96 MMHG | SYSTOLIC BLOOD PRESSURE: 146 MMHG | BODY MASS INDEX: 30.2 KG/M2 | WEIGHT: 192.8 LBS

## 2022-06-16 DIAGNOSIS — Z3A.30 30 WEEKS GESTATION OF PREGNANCY: Primary | ICD-10-CM

## 2022-06-16 PROCEDURE — 0502F SUBSEQUENT PRENATAL CARE: CPT | Performed by: OBSTETRICS & GYNECOLOGY

## 2022-06-16 NOTE — PROGRESS NOTES
Doing well. Checking Bps at home. Have been good. No sx of Pre E.  Pre labs were normal 6/5.        Problems  Dating by LMP=1st Trimester US  Vitamin D Def  History of Gestation HTN G1 taking Baby ASA  Boy -- Etta Bidding  US 19w6d = 20w4d 85% poor views of RVOT and Foot ? SUA rescan 28w -- Has appt 5/31  US 27w6d = 27w5d 43% Hadlock.   RVOT seen SUA -- Rescan growth 3 wk [facility]  Traveling [employer] -- Advised to stop after 30w  Prob mild Gestational Hypertension -- OBS for now  [facility] -- [facility] 6/29  for Growth +/- BPP

## 2022-06-17 RX ORDER — ONDANSETRON 4 MG/1
4-8 TABLET, ORALLY DISINTEGRATING ORAL
Qty: 30 TABLET | Refills: 0 | Status: SHIPPED | OUTPATIENT
Start: 2022-06-17 | End: 2022-07-17

## 2022-06-17 NOTE — TELEPHONE ENCOUNTER
From: Sergio Recinos  To: [facility]  Sent: 6/16/2022 7:42 PM EDT  Subject: Refill     Hello. I meant to ask at my appointment can I get a refill on my nausea medicine?     Thank you   Milo Arnold  [phone]

## 2022-06-27 ENCOUNTER — TELEPHONE (OUTPATIENT)
Dept: OBGYN CLINIC | Age: 32
End: 2022-06-27

## 2022-06-27 ENCOUNTER — ROUTINE PRENATAL (OUTPATIENT)
Dept: OBGYN CLINIC | Age: 32
End: 2022-06-27
Payer: COMMERCIAL

## 2022-06-27 VITALS — BODY MASS INDEX: 30.2 KG/M2 | SYSTOLIC BLOOD PRESSURE: 145 MMHG | DIASTOLIC BLOOD PRESSURE: 97 MMHG | WEIGHT: 192.8 LBS

## 2022-06-27 DIAGNOSIS — O13.3 GESTATIONAL HYPERTENSION, THIRD TRIMESTER: Primary | ICD-10-CM

## 2022-06-27 DIAGNOSIS — Z3A.31 PREGNANCY WITH 31 COMPLETED WEEKS GESTATION: ICD-10-CM

## 2022-06-27 PROCEDURE — 0502F SUBSEQUENT PRENATAL CARE: CPT | Performed by: OBSTETRICS & GYNECOLOGY

## 2022-06-27 RX ORDER — LABETALOL 200 MG/1
200 TABLET, FILM COATED ORAL 2 TIMES DAILY
Qty: 60 TABLET | Refills: 3 | Status: SHIPPED | OUTPATIENT
Start: 2022-06-27 | End: 2022-07-18

## 2022-06-27 NOTE — PROGRESS NOTES
C/o feeling floaty  No ha or visual changes. No edema. DTR 2+/4 no clonus  C/o crampy  BP noted. Suspect gestational hypertension.   Pre E labs  Labetolol 200 BID   FU 4 days   3 days

## 2022-06-27 NOTE — TELEPHONE ENCOUNTER
Message at 15!3PM   28year old  31w 5d pregnant      Patient reports baby moving , denies rom,bleeding reports contractions( mor sandoval)    Patient reports her BP at 135/88/ and 134/ 83 ,and currently denies headache. Patient reports she did have a headache yesterday and took a nap    Patient describes feeling\" floaty. \"      Patient was placed on the schedule to be seen today at 2:00PM to arrive at 1:45Pm    Patient verbalized understanding.           Patient reports she did have to drink something sweet to get the baby moving,    PC advised of add on appointment

## 2022-06-28 LAB
ALBUMIN SERPL-MCNC: 2.8 G/DL (ref 3.5–5)
ALBUMIN/GLOB SERPL: 0.7 {RATIO} (ref 1.1–2.2)
ALP SERPL-CCNC: 115 U/L (ref 45–117)
ALT SERPL-CCNC: 11 U/L (ref 12–78)
ANION GAP SERPL CALC-SCNC: 9 MMOL/L (ref 5–15)
AST SERPL-CCNC: 9 U/L (ref 15–37)
BILIRUB SERPL-MCNC: 0.2 MG/DL (ref 0.2–1)
BUN SERPL-MCNC: 8 MG/DL (ref 6–20)
BUN/CREAT SERPL: 11 (ref 12–20)
CALCIUM SERPL-MCNC: 9.5 MG/DL (ref 8.5–10.1)
CHLORIDE SERPL-SCNC: 105 MMOL/L (ref 97–108)
CO2 SERPL-SCNC: 22 MMOL/L (ref 21–32)
CREAT SERPL-MCNC: 0.74 MG/DL (ref 0.55–1.02)
ERYTHROCYTE [DISTWIDTH] IN BLOOD BY AUTOMATED COUNT: 13.6 % (ref 11.5–14.5)
GLOBULIN SER CALC-MCNC: 4 G/DL (ref 2–4)
GLUCOSE SERPL-MCNC: 77 MG/DL (ref 65–100)
HCT VFR BLD AUTO: 38 % (ref 35–47)
HGB BLD-MCNC: 12.3 G/DL (ref 11.5–16)
MCH RBC QN AUTO: 29.6 PG (ref 26–34)
MCHC RBC AUTO-ENTMCNC: 32.4 G/DL (ref 30–36.5)
MCV RBC AUTO: 91.6 FL (ref 80–99)
NRBC # BLD: 0 K/UL (ref 0–0.01)
NRBC BLD-RTO: 0 PER 100 WBC
PLATELET # BLD AUTO: 341 K/UL (ref 150–400)
PMV BLD AUTO: 9.9 FL (ref 8.9–12.9)
POTASSIUM SERPL-SCNC: 4.1 MMOL/L (ref 3.5–5.1)
PROT SERPL-MCNC: 6.8 G/DL (ref 6.4–8.2)
RBC # BLD AUTO: 4.15 M/UL (ref 3.8–5.2)
SODIUM SERPL-SCNC: 136 MMOL/L (ref 136–145)
URATE SERPL-MCNC: 3 MG/DL (ref 2.6–6)
WBC # BLD AUTO: 12.5 K/UL (ref 3.6–11)

## 2022-06-29 ENCOUNTER — HOSPITAL ENCOUNTER (OUTPATIENT)
Dept: PERINATAL CARE | Age: 32
Discharge: HOME | End: 2022-06-29
Attending: OBSTETRICS & GYNECOLOGY
Payer: COMMERCIAL

## 2022-06-29 PROCEDURE — 76819 FETAL BIOPHYS PROFIL W/O NST: CPT | Performed by: OBSTETRICS & GYNECOLOGY

## 2022-06-29 PROCEDURE — 76816 OB US FOLLOW-UP PER FETUS: CPT | Performed by: OBSTETRICS & GYNECOLOGY

## 2022-06-30 ENCOUNTER — ROUTINE PRENATAL (OUTPATIENT)
Dept: OBGYN CLINIC | Age: 32
End: 2022-06-30
Payer: COMMERCIAL

## 2022-06-30 VITALS — WEIGHT: 193 LBS | BODY MASS INDEX: 30.23 KG/M2 | SYSTOLIC BLOOD PRESSURE: 136 MMHG | DIASTOLIC BLOOD PRESSURE: 86 MMHG

## 2022-06-30 DIAGNOSIS — Z3A.32 32 WEEKS GESTATION OF PREGNANCY: Primary | ICD-10-CM

## 2022-06-30 PROCEDURE — 0502F SUBSEQUENT PRENATAL CARE: CPT | Performed by: OBSTETRICS & GYNECOLOGY

## 2022-06-30 RX ORDER — BUTALBITAL, ACETAMINOPHEN AND CAFFEINE 50; 325; 40 MG/1; MG/1; MG/1
1 TABLET ORAL
Qty: 20 TABLET | Refills: 0 | Status: SHIPPED | OUTPATIENT
Start: 2022-06-30 | End: 2022-07-10

## 2022-06-30 RX ORDER — BUTALBITAL, ACETAMINOPHEN AND CAFFEINE 50; 325; 40 MG/1; MG/1; MG/1
1 TABLET ORAL
Qty: 20 TABLET | Refills: 0 | Status: SHIPPED | OUTPATIENT
Start: 2022-06-30 | End: 2022-06-30 | Stop reason: SDUPTHER

## 2022-06-30 NOTE — TELEPHONE ENCOUNTER
Patient calling back to say that her BP is doing well, she has had a migraine since Tuesday, not relieved and not been able to sleep much    Patient reports feeling floaty feeling if standing for two long and tingling around her scalp area    Patient denies vaginal bleeding,ROM,contractions and is feeling the baby move    Patient appointment moved up to 3:45pM to arrive at 3:30PM today    Pharmacy confirmed    MD to send in script for migraine medication    Patient verbalized understanding.

## 2022-06-30 NOTE — PROGRESS NOTES
Still with HA & Feels floaty. No [place]. Called in [facility] which has helped. Pre e labs all wnl 6/27    Problems  Dating by LMP=1st Trimester US  Vitamin D Def  History of Gestation HTN G1 taking Baby ASA  Boy -- Parveen Netters  US 19w6d = 20w4d 85% poor views of RVOT and Foot ? SUA rescan 28w -- Has appt 5/31  US 27w6d = 27w5d 43% Hadlock.  RVOT seen SUA -- Rescan growth 3 wk [facility]  US 32w0d = 32w6d 33% hadlock.      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

## 2022-07-05 ENCOUNTER — ROUTINE PRENATAL (OUTPATIENT)
Dept: OBGYN CLINIC | Age: 32
End: 2022-07-05
Payer: COMMERCIAL

## 2022-07-05 VITALS — SYSTOLIC BLOOD PRESSURE: 134 MMHG | WEIGHT: 194.6 LBS | DIASTOLIC BLOOD PRESSURE: 89 MMHG | BODY MASS INDEX: 30.48 KG/M2

## 2022-07-05 DIAGNOSIS — O13.3 GESTATIONAL HYPERTENSION, THIRD TRIMESTER: ICD-10-CM

## 2022-07-05 DIAGNOSIS — Z3A.32 32 WEEKS GESTATION OF PREGNANCY: Primary | ICD-10-CM

## 2022-07-05 PROCEDURE — 0502F SUBSEQUENT PRENATAL CARE: CPT | Performed by: OBSTETRICS & GYNECOLOGY

## 2022-07-05 NOTE — PROGRESS NOTES
Seeing spots. A little dizzy & some nausea. No Ha. Bps have been 130's over 80's. No edema. Dtr are 2+/4. No Clonus  Will repeat PRE labs today. Advised pt to work from home & rest [place]  Has PN appt 2 days    Problems  Dating by LMP=1st Trimester US  Vitamin D Def  History of Gestation HTN G1 taking Baby ASA  Boy -- Jordan Certain  US 19w6d = 20w4d 85% poor views of RVOT and Foot ? SUA rescan 28w -- Has appt 5/31  US 27w6d = 27w5d 43% Hadlock.  RVOT seen SUA -- Rescan growth 3 wk [facility]  US 32w0d = 32w6d 33% hadlock.

## 2022-07-06 LAB
ALBUMIN SERPL-MCNC: 2.6 G/DL (ref 3.5–5)
ALBUMIN/GLOB SERPL: 0.7 {RATIO} (ref 1.1–2.2)
ALP SERPL-CCNC: 117 U/L (ref 45–117)
ALT SERPL-CCNC: 16 U/L (ref 12–78)
ANION GAP SERPL CALC-SCNC: 7 MMOL/L (ref 5–15)
AST SERPL-CCNC: 12 U/L (ref 15–37)
BILIRUB SERPL-MCNC: 0.1 MG/DL (ref 0.2–1)
BUN SERPL-MCNC: 12 MG/DL (ref 6–20)
BUN/CREAT SERPL: 19 (ref 12–20)
CALCIUM SERPL-MCNC: 9.2 MG/DL (ref 8.5–10.1)
CHLORIDE SERPL-SCNC: 108 MMOL/L (ref 97–108)
CO2 SERPL-SCNC: 21 MMOL/L (ref 21–32)
CREAT SERPL-MCNC: 0.64 MG/DL (ref 0.55–1.02)
CREAT UR-MCNC: 123 MG/DL
ERYTHROCYTE [DISTWIDTH] IN BLOOD BY AUTOMATED COUNT: 13.4 % (ref 11.5–14.5)
GLOBULIN SER CALC-MCNC: 4 G/DL (ref 2–4)
GLUCOSE SERPL-MCNC: 83 MG/DL (ref 65–100)
HCT VFR BLD AUTO: 36 % (ref 35–47)
HGB BLD-MCNC: 11.8 G/DL (ref 11.5–16)
MCH RBC QN AUTO: 29.6 PG (ref 26–34)
MCHC RBC AUTO-ENTMCNC: 32.8 G/DL (ref 30–36.5)
MCV RBC AUTO: 90.2 FL (ref 80–99)
NRBC # BLD: 0 K/UL (ref 0–0.01)
NRBC BLD-RTO: 0 PER 100 WBC
PLATELET # BLD AUTO: 357 K/UL (ref 150–400)
PMV BLD AUTO: 9.7 FL (ref 8.9–12.9)
POTASSIUM SERPL-SCNC: 3.9 MMOL/L (ref 3.5–5.1)
PROT SERPL-MCNC: 6.6 G/DL (ref 6.4–8.2)
PROT UR-MCNC: 24 MG/DL (ref 0–11.9)
PROT/CREAT UR-RTO: 0.2
RBC # BLD AUTO: 3.99 M/UL (ref 3.8–5.2)
SODIUM SERPL-SCNC: 136 MMOL/L (ref 136–145)
URATE SERPL-MCNC: 3.5 MG/DL (ref 2.6–6)
WBC # BLD AUTO: 12.4 K/UL (ref 3.6–11)

## 2022-07-07 ENCOUNTER — HOSPITAL ENCOUNTER (OUTPATIENT)
Dept: PERINATAL CARE | Age: 32
Discharge: HOME | End: 2022-07-07
Attending: OBSTETRICS & GYNECOLOGY
Payer: COMMERCIAL

## 2022-07-07 PROCEDURE — 76818 FETAL BIOPHYS PROFILE W/NST: CPT | Performed by: OBSTETRICS & GYNECOLOGY

## 2022-07-12 ENCOUNTER — HOSPITAL ENCOUNTER (OUTPATIENT)
Age: 32
Setting detail: OBSERVATION
Discharge: HOME | End: 2022-07-15
Attending: OBSTETRICS & GYNECOLOGY | Admitting: OBSTETRICS & GYNECOLOGY
Payer: COMMERCIAL

## 2022-07-12 ENCOUNTER — TELEPHONE (OUTPATIENT)
Dept: OBGYN CLINIC | Age: 32
End: 2022-07-12

## 2022-07-12 ENCOUNTER — ROUTINE PRENATAL (OUTPATIENT)
Dept: OBGYN CLINIC | Age: 32
End: 2022-07-12
Payer: COMMERCIAL

## 2022-07-12 VITALS
SYSTOLIC BLOOD PRESSURE: 139 MMHG | WEIGHT: 193 LBS | DIASTOLIC BLOOD PRESSURE: 96 MMHG | HEART RATE: 93 BPM | BODY MASS INDEX: 30.23 KG/M2

## 2022-07-12 DIAGNOSIS — Z3A.33 33 WEEKS GESTATION OF PREGNANCY: ICD-10-CM

## 2022-07-12 DIAGNOSIS — G44.1 OTHER VASCULAR HEADACHE: ICD-10-CM

## 2022-07-12 DIAGNOSIS — O13.3 GESTATIONAL HYPERTENSION, THIRD TRIMESTER: Primary | ICD-10-CM

## 2022-07-12 DIAGNOSIS — O13.3 GESTATIONAL HYPERTENSION, THIRD TRIMESTER: ICD-10-CM

## 2022-07-12 PROBLEM — Z34.90 PREGNANCY: Status: ACTIVE | Noted: 2022-07-12

## 2022-07-12 LAB
ALBUMIN SERPL-MCNC: 2.6 G/DL (ref 3.5–5)
ALBUMIN/GLOB SERPL: 0.7 {RATIO} (ref 1.1–2.2)
ALP SERPL-CCNC: 123 U/L (ref 45–117)
ALT SERPL-CCNC: 15 U/L (ref 12–78)
ANION GAP SERPL CALC-SCNC: 11 MMOL/L (ref 5–15)
AST SERPL-CCNC: 11 U/L (ref 15–37)
BASOPHILS # BLD: 0 K/UL (ref 0–0.1)
BASOPHILS NFR BLD: 0 % (ref 0–1)
BILIRUB SERPL-MCNC: 0.2 MG/DL (ref 0.2–1)
BUN SERPL-MCNC: 10 MG/DL (ref 6–20)
BUN/CREAT SERPL: 13 (ref 12–20)
CALCIUM SERPL-MCNC: 9.8 MG/DL (ref 8.5–10.1)
CHLORIDE SERPL-SCNC: 105 MMOL/L (ref 97–108)
CO2 SERPL-SCNC: 21 MMOL/L (ref 21–32)
CREAT SERPL-MCNC: 0.76 MG/DL (ref 0.55–1.02)
CREAT UR-MCNC: 59 MG/DL
DIFFERENTIAL METHOD BLD: ABNORMAL
EOSINOPHIL # BLD: 0.4 K/UL (ref 0–0.4)
EOSINOPHIL NFR BLD: 3 % (ref 0–7)
ERYTHROCYTE [DISTWIDTH] IN BLOOD BY AUTOMATED COUNT: 12.7 % (ref 11.5–14.5)
GLOBULIN SER CALC-MCNC: 4 G/DL (ref 2–4)
GLUCOSE SERPL-MCNC: 110 MG/DL (ref 65–100)
HCT VFR BLD AUTO: 33.4 % (ref 35–47)
HGB BLD-MCNC: 11.5 G/DL (ref 11.5–16)
IMM GRANULOCYTES # BLD AUTO: 0 K/UL (ref 0–0.04)
IMM GRANULOCYTES NFR BLD AUTO: 0 % (ref 0–0.5)
LDH SERPL L TO P-CCNC: 127 U/L (ref 81–246)
LYMPHOCYTES # BLD: 1.8 K/UL (ref 0.8–3.5)
LYMPHOCYTES NFR BLD: 16 % (ref 12–49)
MCH RBC QN AUTO: 29.3 PG (ref 26–34)
MCHC RBC AUTO-ENTMCNC: 34.4 G/DL (ref 30–36.5)
MCV RBC AUTO: 85 FL (ref 80–99)
MONOCYTES # BLD: 0.6 K/UL (ref 0–1)
MONOCYTES NFR BLD: 5 % (ref 5–13)
NEUTS SEG # BLD: 8.6 K/UL (ref 1.8–8)
NEUTS SEG NFR BLD: 76 % (ref 32–75)
NRBC # BLD: 0 K/UL (ref 0–0.01)
NRBC BLD-RTO: 0 PER 100 WBC
PLATELET # BLD AUTO: 343 K/UL (ref 150–400)
PMV BLD AUTO: 9.2 FL (ref 8.9–12.9)
POTASSIUM SERPL-SCNC: 3.9 MMOL/L (ref 3.5–5.1)
PROT SERPL-MCNC: 6.6 G/DL (ref 6.4–8.2)
PROT UR-MCNC: 10 MG/DL (ref 0–11.9)
PROT/CREAT UR-RTO: 0.2
RBC # BLD AUTO: 3.93 M/UL (ref 3.8–5.2)
SODIUM SERPL-SCNC: 137 MMOL/L (ref 136–145)
URATE SERPL-MCNC: 3.7 MG/DL (ref 2.6–6)
WBC # BLD AUTO: 11.4 K/UL (ref 3.6–11)

## 2022-07-12 PROCEDURE — 85025 COMPLETE CBC W/AUTO DIFF WBC: CPT

## 2022-07-12 PROCEDURE — 0502F SUBSEQUENT PRENATAL CARE: CPT | Performed by: OBSTETRICS & GYNECOLOGY

## 2022-07-12 PROCEDURE — 74011250637 HC RX REV CODE- 250/637: Performed by: OBSTETRICS & GYNECOLOGY

## 2022-07-12 PROCEDURE — 84550 ASSAY OF BLOOD/URIC ACID: CPT

## 2022-07-12 PROCEDURE — 36415 COLL VENOUS BLD VENIPUNCTURE: CPT

## 2022-07-12 PROCEDURE — 80053 COMPREHEN METABOLIC PANEL: CPT

## 2022-07-12 PROCEDURE — G0378 HOSPITAL OBSERVATION PER HR: HCPCS

## 2022-07-12 PROCEDURE — 84156 ASSAY OF PROTEIN URINE: CPT

## 2022-07-12 PROCEDURE — 83615 LACTATE (LD) (LDH) ENZYME: CPT

## 2022-07-12 PROCEDURE — 59025 FETAL NON-STRESS TEST: CPT | Performed by: OBSTETRICS & GYNECOLOGY

## 2022-07-12 PROCEDURE — 99218 PR INITIAL OBSERVATION CARE/DAY 30 MINUTES: CPT | Performed by: OBSTETRICS & GYNECOLOGY

## 2022-07-12 RX ORDER — LANOLIN ALCOHOL/MO/W.PET/CERES
CREAM (GRAM) TOPICAL DAILY
COMMUNITY

## 2022-07-12 RX ORDER — DIPHENHYDRAMINE HCL 25 MG
25 CAPSULE ORAL
Status: DISCONTINUED | OUTPATIENT
Start: 2022-07-12 | End: 2022-07-15 | Stop reason: HOSPADM

## 2022-07-12 RX ORDER — LABETALOL 200 MG/1
200 TABLET, FILM COATED ORAL EVERY 12 HOURS
Status: DISCONTINUED | OUTPATIENT
Start: 2022-07-12 | End: 2022-07-15 | Stop reason: HOSPADM

## 2022-07-12 RX ORDER — ZOLPIDEM TARTRATE 5 MG/1
5 TABLET ORAL
Status: DISCONTINUED | OUTPATIENT
Start: 2022-07-12 | End: 2022-07-15 | Stop reason: HOSPADM

## 2022-07-12 RX ORDER — ACETAMINOPHEN 325 MG/1
650 TABLET ORAL
Status: DISCONTINUED | OUTPATIENT
Start: 2022-07-12 | End: 2022-07-15 | Stop reason: HOSPADM

## 2022-07-12 RX ADMIN — DIPHENHYDRAMINE HYDROCHLORIDE 25 MG: 25 CAPSULE ORAL at 21:38

## 2022-07-12 RX ADMIN — LABETALOL HYDROCHLORIDE 200 MG: 200 TABLET, FILM COATED ORAL at 21:20

## 2022-07-12 RX ADMIN — ACETAMINOPHEN 650 MG: 325 TABLET ORAL at 16:54

## 2022-07-12 NOTE — H&P
History & Physical    Name: Tawanna Eisenberg MRN: [id number]  SSN: [id number]    YOB: 1990  Age: 28 y.o. Sex: female        Subjective:     Estimated Date of Delivery: 22  OB History        3    Para   1    Term   1            AB   1    Living   1       SAB   1    IAB        Ectopic        Molar        Multiple        Live Births   1                Ms. Rosa Colbert is admitted with pregnancy at 33w6d for elevated BP and headache. She has GHTN and today overall has not been feeling well, had dizziness and headache and had home BP's of 140s/100s. Prenatal course  Was complicated by a single umbilical artery and GHTN. Please see prenatal records for details. Past Medical History:   Diagnosis Date    Anemia     Celiac disease     COVID-19 2021    Gestational hypertension     with 1st pregnancy    PIH (pregnancy induced hypertension)     Vitamin D deficiency 02/10/2022    17.5     Past Surgical History:   Procedure Laterality Date    HX ENDOSCOPY       Social History     Occupational History    Occupation: Marketing   Tobacco Use    Smoking status: Never Smoker    Smokeless tobacco: Never Used   Vaping Use    Vaping Use: Never used   Substance and Sexual Activity    Alcohol use: Not Currently    Drug use: Never    Sexual activity: Yes     Partners: Male     Birth control/protection: None     Family History   Problem Relation Age of Onset    Other Mother         Fibroids    Breast Cancer Maternal Grandmother     Ovarian Cancer Maternal Grandmother        Allergies   Allergen Reactions    Gluten Nausea and Vomiting     Prior to Admission medications    Medication Sig Start Date End Date Taking? Authorizing Provider   cholecalciferol, vitamin D3, (VITAMIN D3 PO) Take  by mouth. Yes Provider, Historical   ferrous sulfate 325 mg (65 mg iron) tablet Take  by mouth daily.    Yes Provider, Historical   labetaloL (NORMODYNE) 200 mg tablet Take 1 Tablet by mouth two (2) times a day for 30 days. 22 Yes Maryjane Driver MD   ondansetron (ZOFRAN ODT) 4 mg disintegrating tablet Take 1-2 Tablets by mouth every eight (8) hours as needed for Nausea for up to 30 days. Indications: excessive vomiting in pregnancy 22 Yes Maryjane Driver MD   prenatal vit-calcium-iron-fa (PRENATAL PLUS with CALCIUM) 27 mg iron- 1 mg tab Take 1 Tablet by mouth daily. Yes Provider, Historical   aspirin 81 mg chewable tablet Take 81 mg by mouth daily. Yes Provider, Historical        Review of Systems: A comprehensive review of systems was negative except for that written in the HPI. Objective:     Vitals:  Vitals:    22 1451 22 1456 22 1501 22 1512   BP:  131/80     Pulse:  96     Resp:       Temp:       SpO2: 97% 99% 96% 98%   Height:            Physical Exam:  Patient without distress. Heart: Regular rate and rhythm  Lung: normal respiratory effort  Fundus: soft and non tender  Lower Extremities:  - Edema 1+  Membranes:  Intact  Fetal Heart Rate: Reactive, baseline 150, moderate variability, + accels, no decels, no contractions, monitoroed > 30  minutes      Prenatal Labs:   Lab Results   Component Value Date/Time    Rubella, External Immune 02/10/2022 12:00 AM    HBsAg, External Negative 02/10/2022 12:00 AM    HIV, External Negative 02/10/2022 12:00 AM    RPR, External Non Reactive 02/10/2022 12:00 AM    Gonorrhea, External Negative 02/10/2022 12:00 AM    Chlamydia, External  Negative 02/10/2022 12:00 AM        Assessment/Plan:     28 y.o.   at 33w6d    Plan: Admit for GHTN with headache and BP above baseine. Will check PIH labs and collect 24 hour urine. If headache still present will consult MFM. Continue labetalol 200 mg bid.     Signed By:  Rhina Alexander MD     2022

## 2022-07-12 NOTE — PROGRESS NOTES
1400 pt here from md office with c/o not feeling well this morning, pt reports sitting and feeling dizzy/light headed with a tingly feeling on her scalp. Pt has hx of ghtn. Pt reports h/a that started last night, elevated bp at home of 148/102-139/97.   Placed pt on efm and labs drawn    1521 perfect serve to dr Alejandra Nath regarding labs and bp ranges, 24 hour urine started for 1500    1615 dr Alejandra Nath in and spoke to pt, pt agreed to plan of care    1903 sbar report given to анна cerna rn

## 2022-07-12 NOTE — PROGRESS NOTES
1900 Bedside and Verbal shift change report given to Crystal Donaldson (oncoming nurse) by Megha Walker (offgoing nurse). Report included the following information SBAR, Kardex, Procedure Summary, Intake/Output, MAR and Recent Results. 0715 Bedside and Verbal shift change report given to Maine Chavarria (oncoming nurse) by Crystal Donaldson (offgoing nurse). Report included the following information SBAR, Kardex, Procedure Summary, Intake/Output, MAR and Recent Results.

## 2022-07-12 NOTE — PROGRESS NOTES
EXTRA VISIT:  Pt of Dr. Parris Victoria. GHTN, taking labetalol 200mg BID, SUA (sees MFM wkly). Home BPs usually 130s/80s. Feeling dizzy today so took BP, readings 140s/100s, which is a definite change for her. Has new onset HA (mild, 1-2/10), \"just not feeling well\".   Will send to L&D for labs, serial BPs, NST>

## 2022-07-13 ENCOUNTER — APPOINTMENT (OUTPATIENT)
Dept: MRI IMAGING | Age: 32
End: 2022-07-13
Attending: NURSE PRACTITIONER
Payer: COMMERCIAL

## 2022-07-13 LAB
COLLECT DURATION TIME UR: 24 HR
COLLECT DURATION TIME UR: 24 HR
CREAT 24H UR-MRATE: 1290 MG/24HR (ref 600–1800)
PROT 24H UR-MRATE: 268 MG/24HR
SPECIMEN VOL ?TM UR: 3350 ML
SPECIMEN VOL ?TM UR: 3350 ML

## 2022-07-13 PROCEDURE — 82570 ASSAY OF URINE CREATININE: CPT

## 2022-07-13 PROCEDURE — 74011250637 HC RX REV CODE- 250/637: Performed by: OBSTETRICS & GYNECOLOGY

## 2022-07-13 PROCEDURE — 99222 1ST HOSP IP/OBS MODERATE 55: CPT | Performed by: OBSTETRICS & GYNECOLOGY

## 2022-07-13 PROCEDURE — 84156 ASSAY OF PROTEIN URINE: CPT

## 2022-07-13 PROCEDURE — G0378 HOSPITAL OBSERVATION PER HR: HCPCS

## 2022-07-13 PROCEDURE — 96365 THER/PROPH/DIAG IV INF INIT: CPT

## 2022-07-13 PROCEDURE — 70551 MRI BRAIN STEM W/O DYE: CPT

## 2022-07-13 PROCEDURE — 99224 PR SBSQ OBSERVATION CARE/DAY 15 MINUTES: CPT | Performed by: OBSTETRICS & GYNECOLOGY

## 2022-07-13 PROCEDURE — 96372 THER/PROPH/DIAG INJ SC/IM: CPT

## 2022-07-13 PROCEDURE — 96366 THER/PROPH/DIAG IV INF ADDON: CPT

## 2022-07-13 PROCEDURE — 74011250636 HC RX REV CODE- 250/636: Performed by: OBSTETRICS & GYNECOLOGY

## 2022-07-13 PROCEDURE — 70544 MR ANGIOGRAPHY HEAD W/O DYE: CPT

## 2022-07-13 PROCEDURE — 74011250637 HC RX REV CODE- 250/637: Performed by: NURSE PRACTITIONER

## 2022-07-13 PROCEDURE — 74011250636 HC RX REV CODE- 250/636: Performed by: NURSE PRACTITIONER

## 2022-07-13 RX ORDER — MAGNESIUM SULFATE HEPTAHYDRATE 40 MG/ML
2 INJECTION, SOLUTION INTRAVENOUS ONCE
Status: COMPLETED | OUTPATIENT
Start: 2022-07-13 | End: 2022-07-13

## 2022-07-13 RX ORDER — BETAMETHASONE SODIUM PHOSPHATE AND BETAMETHASONE ACETATE 3; 3 MG/ML; MG/ML
12 INJECTION, SUSPENSION INTRA-ARTICULAR; INTRALESIONAL; INTRAMUSCULAR; SOFT TISSUE EVERY 24 HOURS
Status: COMPLETED | OUTPATIENT
Start: 2022-07-14 | End: 2022-07-14

## 2022-07-13 RX ORDER — BETAMETHASONE SODIUM PHOSPHATE AND BETAMETHASONE ACETATE 3; 3 MG/ML; MG/ML
12 INJECTION, SUSPENSION INTRA-ARTICULAR; INTRALESIONAL; INTRAMUSCULAR; SOFT TISSUE EVERY 12 HOURS
Status: DISCONTINUED | OUTPATIENT
Start: 2022-07-13 | End: 2022-07-13

## 2022-07-13 RX ORDER — BUTALBITAL, ACETAMINOPHEN AND CAFFEINE 50; 325; 40 MG/1; MG/1; MG/1
1 TABLET ORAL
Status: DISCONTINUED | OUTPATIENT
Start: 2022-07-13 | End: 2022-07-15 | Stop reason: HOSPADM

## 2022-07-13 RX ADMIN — ACETAMINOPHEN 650 MG: 325 TABLET ORAL at 05:16

## 2022-07-13 RX ADMIN — MAGNESIUM SULFATE HEPTAHYDRATE 2 G: 40 INJECTION, SOLUTION INTRAVENOUS at 18:30

## 2022-07-13 RX ADMIN — LABETALOL HYDROCHLORIDE 200 MG: 200 TABLET, FILM COATED ORAL at 09:12

## 2022-07-13 RX ADMIN — ACETAMINOPHEN 650 MG: 325 TABLET ORAL at 15:27

## 2022-07-13 RX ADMIN — BETAMETHASONE SODIUM PHOSPHATE AND BETAMETHASONE ACETATE 12 MG: 3; 3 INJECTION, SUSPENSION INTRA-ARTICULAR; INTRALESIONAL; INTRAMUSCULAR at 15:27

## 2022-07-13 RX ADMIN — LABETALOL HYDROCHLORIDE 200 MG: 200 TABLET, FILM COATED ORAL at 20:52

## 2022-07-13 RX ADMIN — BUTALBITAL, ACETAMINOPHEN, AND CAFFEINE 1 TABLET: 50; 325; 40 TABLET ORAL at 18:29

## 2022-07-13 RX ADMIN — DIPHENHYDRAMINE HYDROCHLORIDE 25 MG: 25 CAPSULE ORAL at 23:46

## 2022-07-13 NOTE — CONSULTS
[facility]: [id number] [address] Neurology  [place] [address]  [phone]          Name:   Drew Landrum record #: [id number]  Admission Date: 7/12/2022     Who Consulted: Dr. Alexandria Leong    Reason for Consult:  Persistent headache    HISTORY OF PRESENT ILLNESS:     This is a 28 y.o. female who is admitted for elevated Bp in pregnancy. Upon admission her Bp was 145/100, it has slowly been decreased to 139/91 over the last 24 hours. We are asked to see Ms. Hutchins for a two day history of persistent frontal headache. She has a history of migraine which is typically a visual aura followed by a right sided throbbing headache associated with nausea and photophobia. Prior to pregnncy these were well controlled with Sumatriptan and tylenol. She describes her current headache as 2/10, frontal and not associated with nausea or photophobia. She has not seen a change in her headache with the lowering of her Bp. Care Plan discussed with:  Patient x   Family    RN X   Care Manager    Consultant/Specialist:  X       Thank you for allowing the Neurology Service the pleasure of participating in the care of your patient. Libia Ross, [facility]  ====================    Impression/ Plan:      1. Possible hypertensive headache   · Will get MRI and MRV to rule out venous sinus thrombosis  · Magnesium 2 gm IVBP  · Fiorcet, 1 tablet q4hrs prn  ·          Review of Systems: 10 point ROS was performed. Pertinent positives listed in HPI. Negative ROS is as follows. Pt denies: angina, palpitations, paresthesias, weakness, vision loss, slurred speech, aphasia, confusion, fever, chills, falls, headache, diplopia, back pain, neck pain, prior episodes of vertigo, hallucinations, new medications or dosage changes. No data found. NEUROLOGICAL EXAM:     General:  Mental Status: Alert, cooperative, no acute distress  Oriented to time, place and person. Fully attentive.  No aphasia. Full fund of knowledge. Normal recent and remote memory. Cranial Nerves:   Visual Fields:  normal in all quadrants in both eyes. EOM: no nystagmus. Facial movements:  symmetric, no ptosis Facial sensation:  intact to LT on both sides. Hearing:  normal.       Language:  no dysarthria, no aphasia, normal fluency, normal repetition. Tongue: midline. Soft palate: not examined  SCMs: normal, symmetric. Reflexes:   LUExt: 2+/ 4                 RUExt: 2+/ 4  LLExt: 2+/4                  RLExt: 2+/ 4          Sensory:   LT and Temp intact in all extremities            Cerebellar:  No resting, no postural tremors, normal finger nose finger. No pronator drift                            Motor:           LUExt: 5/ 5               RUExt: 5/ 5                                              LLExt: 5/ 5                RLExt: 5/ 5        Gait:   Not tested              Allergies: Allergies   Allergen Reactions    Gluten Nausea and Vomiting       Outpatient Meds  No current facility-administered medications on file prior to encounter. Current Outpatient Medications on File Prior to Encounter   Medication Sig Dispense Refill    cholecalciferol, vitamin D3, (VITAMIN D3 PO) Take  by mouth.  ferrous sulfate 325 mg (65 mg iron) tablet Take  by mouth daily.  labetaloL (NORMODYNE) 200 mg tablet Take 1 Tablet by mouth two (2) times a day for 30 days. 60 Tablet 3    ondansetron (ZOFRAN ODT) 4 mg disintegrating tablet Take 1-2 Tablets by mouth every eight (8) hours as needed for Nausea for up to 30 days. Indications: excessive vomiting in pregnancy 30 Tablet 0    prenatal vit-calcium-iron-fa (PRENATAL PLUS with CALCIUM) 27 mg iron- 1 mg tab Take 1 Tablet by mouth daily.  aspirin 81 mg chewable tablet Take 81 mg by mouth daily.          Inpatient Meds    Current Facility-Administered Medications   Medication Dose Route Frequency Provider Last Rate Last Admin    betamethasone (CELESTONE) injection 12 mg  12 mg IntraMUSCular Q12H Yolanda Freeman MD   12 mg at 07/13/22 1527    labetaloL (NORMODYNE) tablet 200 mg  200 mg Oral Q12H Car Sarmiento MD   200 mg at 07/13/22 0912    zolpidem (AMBIEN) tablet 5 mg  5 mg Oral QHS PRN Car Sarmiento MD        acetaminophen (TYLENOL) tablet 650 mg  650 mg Oral Q4H PRN Car Sarmiento MD   650 mg at 07/13/22 1527    diphenhydrAMINE (BENADRYL) capsule 25 mg  25 mg Oral Q6H PRN Francisco Trevizo MD   25 mg at 07/12/22 2138           Past Medical History:   Diagnosis Date    Anemia     Celiac disease     COVID-19 12/25/2021    Gestational hypertension     with 1st pregnancy    PIH (pregnancy induced hypertension)     Vitamin D deficiency 02/10/2022    17.5       Past Surgical History:   Procedure Laterality Date    HX ENDOSCOPY         family history includes Breast Cancer in her maternal grandmother; Other in her mother; Ovarian Cancer in her maternal grandmother. reports that she has never smoked. She has never used smokeless tobacco. She reports previous alcohol use. She reports that she does not use drugs. Lab Results (last 24 hrs)  No results found for this or any previous visit (from the past 24 hour(s)).

## 2022-07-13 NOTE — CONSULTS
MATERNAL FETAL MEDICINE  INPATIENT CONSULTATION    REQUESTED BY: Gab Alamo MD   DATE OF CONSULT: 22    REASON FOR CONSULTATION: gHTN with HA    HPI  Carol Otto is a 28 y.o. female  at 34w0d who was admitted 22 with elevated Bps and a headache. She had her 1st mild-range BP at 27 weeks this pregnancy; she states that she has only had elevated Bps in the 3rd trimesters of her pregnancies. She was diagnosed with gestational hypertension and started on labetalol 200 mg BID on 22. She has had some normal to mild-range values while in-house. She is currently c/o a HA now that is different from her migraines; she is o/w asymptomatic and we reviewed PIH precautions again. She denies caffeine withdrawal and states that her stress level is not above baseline.         Patient Active Problem List   Diagnosis Code    Vitamin D deficiency B66.0    Single umbilical artery S28.9    Gestational hypertension, third trimester O13.3    Pregnancy Z34.90       Past Medical History:   Diagnosis Date    Anemia     Celiac disease     COVID-19 2021    Gestational hypertension     with 1st pregnancy    PIH (pregnancy induced hypertension)     Vitamin D deficiency 02/10/2022    17.5       Past Surgical History:   Procedure Laterality Date    HX ENDOSCOPY         OB History    Para Term  AB Living   3 1 1 0 1 1   SAB IAB Ectopic Molar Multiple Live Births   1 0 0 0 0 1       Allergies   Allergen Reactions    Gluten Nausea and Vomiting         Current Facility-Administered Medications:     labetaloL (NORMODYNE) tablet 200 mg, 200 mg, Oral, Q12H, Tania Aldana MD, 200 mg at 22 0912    zolpidem (AMBIEN) tablet 5 mg, 5 mg, Oral, QHS PRN, Alphonse Crane MD    acetaminophen (TYLENOL) tablet 650 mg, 650 mg, Oral, Q4H PRN, Alphonse Crane MD, 650 mg at 22 0516    diphenhydrAMINE (BENADRYL) capsule 25 mg, 25 mg, Oral, Q6H PRN, Merry Jimenes MD, 25 mg at 07/12/22 2138    Social History     Tobacco Use    Smoking status: Never Smoker    Smokeless tobacco: Never Used   Vaping Use    Vaping Use: Never used   Substance Use Topics    Alcohol use: Not Currently    Drug use: Never       Family History   Problem Relation Age of Onset    Other Mother         Fibroids    Breast Cancer Maternal Grandmother     Ovarian Cancer Maternal Grandmother        ROS    Visit Vitals  BP (!) 139/91 (BP 1 Location: Left upper arm, BP Patient Position: Semi fowlers)   Pulse 91   Temp 97.9 °F (36.6 °C)   Resp 16   Ht 5' 7\" (1.702 m)   LMP 11/17/2021 (Exact Date)   SpO2 100%   BMI 30.23 kg/m²       Gen: Comfortable, NAD  Resp: Comfortable respirations  CV: Well perfused  Abd: Gravid, NT, ND  Ext: Moving all extremities well  Neuro: Alert, interactive, appropriate    Recent Results (from the past 24 hour(s))   CBC WITH AUTOMATED DIFF    Collection Time: 07/12/22  1:34 PM   Result Value Ref Range    WBC 11.4 (H) 3.6 - 11.0 K/uL    RBC 3.93 3.80 - 5.20 M/uL    HGB 11.5 11.5 - 16.0 g/dL    HCT 33.4 (L) 35.0 - 47.0 %    MCV 85.0 80.0 - 99.0 FL    MCH 29.3 26.0 - 34.0 PG    MCHC 34.4 30.0 - 36.5 g/dL    RDW 12.7 11.5 - 14.5 %    PLATELET 064 264 - 734 K/uL    MPV 9.2 8.9 - 12.9 FL    NRBC 0.0 0  WBC    ABSOLUTE NRBC 0.00 0.00 - 0.01 K/uL    NEUTROPHILS 76 (H) 32 - 75 %    LYMPHOCYTES 16 12 - 49 %    MONOCYTES 5 5 - 13 %    EOSINOPHILS 3 0 - 7 %    BASOPHILS 0 0 - 1 %    IMMATURE GRANULOCYTES 0 0.0 - 0.5 %    ABS. NEUTROPHILS 8.6 (H) 1.8 - 8.0 K/UL    ABS. LYMPHOCYTES 1.8 0.8 - 3.5 K/UL    ABS. MONOCYTES 0.6 0.0 - 1.0 K/UL    ABS. EOSINOPHILS 0.4 0.0 - 0.4 K/UL    ABS. BASOPHILS 0.0 0.0 - 0.1 K/UL    ABS. IMM.  GRANS. 0.0 0.00 - 0.04 K/UL    DF AUTOMATED     METABOLIC PANEL, COMPREHENSIVE    Collection Time: 07/12/22  1:34 PM   Result Value Ref Range    Sodium 137 136 - 145 mmol/L    Potassium 3.9 3.5 - 5.1 mmol/L    Chloride 105 97 - 108 mmol/L    CO2 21 21 - 32 mmol/L    Anion gap 11 5 - 15 mmol/L    Glucose 110 (H) 65 - 100 mg/dL    BUN 10 6 - 20 MG/DL    Creatinine 0.76 0.55 - 1.02 MG/DL    BUN/Creatinine ratio 13 12 - 20      GFR est AA >60 >60 ml/min/1.73m2    GFR est non-AA >60 >60 ml/min/1.73m2    Calcium 9.8 8.5 - 10.1 MG/DL    Bilirubin, total 0.2 0.2 - 1.0 MG/DL    ALT (SGPT) 15 12 - 78 U/L    AST (SGOT) 11 (L) 15 - 37 U/L    Alk. phosphatase 123 (H) 45 - 117 U/L    Protein, total 6.6 6.4 - 8.2 g/dL    Albumin 2.6 (L) 3.5 - 5.0 g/dL    Globulin 4.0 2.0 - 4.0 g/dL    A-G Ratio 0.7 (L) 1.1 - 2.2     LD    Collection Time: 22  1:34 PM   Result Value Ref Range     81 - 246 U/L   URIC ACID    Collection Time: 22  1:34 PM   Result Value Ref Range    Uric acid 3.7 2.6 - 6.0 MG/DL   PROTEIN/CREATININE RATIO, URINE    Collection Time: 22  2:05 PM   Result Value Ref Range    Protein, urine random 10 0.0 - 11.9 mg/dL    Creatinine, urine 59.00 mg/dL    Protein/Creat. urine Ratio 0.2         ULTRASOUND:  BPP 8/8  Transverse position  AFV WNL    ASSESSMENT:    28 y.o. female  at 34w0d with gHTN who was placed on labetolol 200mg bid and admitted last night with elevated Bps and a HA. PLAN:     Neurology consult & deliver if HA is not thought to be from another neurologic pathologic source    Would initiate a course of betamethasone but would not delay delivery to complete the course   NICU consult   Deliver with worsening blood pressure values/symptoms, PIH labs concerning for HELLP syndrome, or non-reassuring fetal status    The patient visit was approximately 60 minutes with greater than half spent in face-to face counseling and coordination of care.     Merlinda Penna, M.D.

## 2022-07-13 NOTE — PROGRESS NOTES
S/ still with a slight HA but better; intermittent RUQ pain but \"feels like a baby foot\"; good FM    O/ Bp max 145/100 and 154/91 on admit; 127-140/81-94 overnight    Abd: gravid, NT  DTR: 2/4  Ext: no edema      PIH labs normal    A/ IUP 34 0/7 weeks with h/o GHTN on Labetalol 200 mg BID; had jose increase BP at home from baseline and HA, admitted overnight for observation, PIH labs normal, symptoms improved and BP better        SUA followed weekly by MFM    P/ see MFM today instead of scheduled appt tomorrow; follow vitals today; continue labetalol 200 BID

## 2022-07-13 NOTE — PROGRESS NOTES
1910 Bedside and Verbal shift change report given to Perico Rodríguez (oncoming nurse) by Fady Soliz (offgoing nurse). Report included the following information SBAR, Kardex, Procedure Summary, Intake/Output, MAR and Recent Results. 1586 Bedside and Verbal shift change report given to [facility] (oncoming nurse) by Perico Rodríguez (offgoing nurse). Report included the following information SBAR, Kardex, Procedure Summary, Intake/Output, MAR and Recent Results.

## 2022-07-13 NOTE — PROGRESS NOTES
2022  12:13 PM    CM met with MARIA T to complete initial assessment and begin discharge planning. MOB verified and confirmed demographics. MARIA T lives with Julian Mcmullen ( ), along with their 2yr old, at the address on file. MARIA T is employed and plans to take time off from work. YVETTE is a stay at home dad, and will be home with infant. MARIA T reports she has good family support, and feels like she has the support she needs when she returns home. MARIA T plans to breast feed baby and has pump to use at home. [facility], will provide follow up care for infant. MARIA T has car seat, bassinet/crib, clothing, bottles and all necessary supplies for baby. MARIA T has [employer], and will be adding baby to this policy. CM discussed process to add baby to insurance, MOB verbalized understanding. MARIA T denied needing WIC/Medicaid services. MARIA T is , 34wk, admitted for elevated BP. Plan for MOB to see MFM and 24hr urine. CM following for needs. Medicare Outpatient Observation Notice (MOON)/ [place] Outpatient Observation Notice (Sandy Navas) provided to patient/representative with verbal explanation of the notice. Time allotted for questions regarding the notice. Care Management Interventions  PCP Verified by CM: Yes Rafiq Cohen)  Mode of Transport at Discharge:  Other (see comment)  Transition of Care Consult (CM Consult): Discharge Planning  Support Systems: Other Family Member(s),Spouse/Significant Other  Confirm Follow Up Transport: Family  Discharge Location  Patient Expects to be Discharged to[de-identified] Home with family assistance  Mariah Sethi

## 2022-07-13 NOTE — PROGRESS NOTES
[facility]Resides - Neurology at bedside for consultation regarding headache. New orders placed by NP.    1700 - Pt taken via wheelchair to MRI by transport team.    1800 - pt returned to room 216 via wheelchair. 1900 - report given to Tam Becker RN in SBAR format for continued care at this time.

## 2022-07-14 LAB
ALBUMIN SERPL-MCNC: 2.4 G/DL (ref 3.5–5)
ALBUMIN/GLOB SERPL: 0.6 {RATIO} (ref 1.1–2.2)
ALP SERPL-CCNC: 112 U/L (ref 45–117)
ALT SERPL-CCNC: 13 U/L (ref 12–78)
ANION GAP SERPL CALC-SCNC: 12 MMOL/L (ref 5–15)
AST SERPL-CCNC: 8 U/L (ref 15–37)
BASOPHILS # BLD: 0 K/UL (ref 0–0.1)
BASOPHILS NFR BLD: 0 % (ref 0–1)
BILIRUB SERPL-MCNC: 0.2 MG/DL (ref 0.2–1)
BUN SERPL-MCNC: 10 MG/DL (ref 6–20)
BUN/CREAT SERPL: 15 (ref 12–20)
CALCIUM SERPL-MCNC: 8.5 MG/DL (ref 8.5–10.1)
CHLORIDE SERPL-SCNC: 107 MMOL/L (ref 97–108)
CO2 SERPL-SCNC: 19 MMOL/L (ref 21–32)
CREAT SERPL-MCNC: 0.66 MG/DL (ref 0.55–1.02)
DIFFERENTIAL METHOD BLD: ABNORMAL
EOSINOPHIL # BLD: 0 K/UL (ref 0–0.4)
EOSINOPHIL NFR BLD: 0 % (ref 0–7)
ERYTHROCYTE [DISTWIDTH] IN BLOOD BY AUTOMATED COUNT: 12.7 % (ref 11.5–14.5)
GLOBULIN SER CALC-MCNC: 3.8 G/DL (ref 2–4)
GLUCOSE SERPL-MCNC: 119 MG/DL (ref 65–100)
HCT VFR BLD AUTO: 32.1 % (ref 35–47)
HGB BLD-MCNC: 10.6 G/DL (ref 11.5–16)
IMM GRANULOCYTES # BLD AUTO: 0.1 K/UL (ref 0–0.04)
IMM GRANULOCYTES NFR BLD AUTO: 1 % (ref 0–0.5)
LYMPHOCYTES # BLD: 2 K/UL (ref 0.8–3.5)
LYMPHOCYTES NFR BLD: 17 % (ref 12–49)
MCH RBC QN AUTO: 28.4 PG (ref 26–34)
MCHC RBC AUTO-ENTMCNC: 33 G/DL (ref 30–36.5)
MCV RBC AUTO: 86.1 FL (ref 80–99)
MONOCYTES # BLD: 0.8 K/UL (ref 0–1)
MONOCYTES NFR BLD: 7 % (ref 5–13)
NEUTS SEG # BLD: 9 K/UL (ref 1.8–8)
NEUTS SEG NFR BLD: 75 % (ref 32–75)
NRBC # BLD: 0 K/UL (ref 0–0.01)
NRBC BLD-RTO: 0 PER 100 WBC
PLATELET # BLD AUTO: 331 K/UL (ref 150–400)
PMV BLD AUTO: 9.3 FL (ref 8.9–12.9)
POTASSIUM SERPL-SCNC: 3.7 MMOL/L (ref 3.5–5.1)
PROT SERPL-MCNC: 6.2 G/DL (ref 6.4–8.2)
RBC # BLD AUTO: 3.73 M/UL (ref 3.8–5.2)
SODIUM SERPL-SCNC: 138 MMOL/L (ref 136–145)
WBC # BLD AUTO: 11.9 K/UL (ref 3.6–11)

## 2022-07-14 PROCEDURE — 96372 THER/PROPH/DIAG INJ SC/IM: CPT

## 2022-07-14 PROCEDURE — 99218 PR INITIAL OBSERVATION CARE/DAY 30 MINUTES: CPT | Performed by: OBSTETRICS & GYNECOLOGY

## 2022-07-14 PROCEDURE — 85025 COMPLETE CBC W/AUTO DIFF WBC: CPT

## 2022-07-14 PROCEDURE — 99213 OFFICE O/P EST LOW 20 MIN: CPT | Performed by: PSYCHIATRY & NEUROLOGY

## 2022-07-14 PROCEDURE — 59025 FETAL NON-STRESS TEST: CPT

## 2022-07-14 PROCEDURE — 74011250636 HC RX REV CODE- 250/636: Performed by: OBSTETRICS & GYNECOLOGY

## 2022-07-14 PROCEDURE — 80053 COMPREHEN METABOLIC PANEL: CPT

## 2022-07-14 PROCEDURE — 87081 CULTURE SCREEN ONLY: CPT

## 2022-07-14 PROCEDURE — 59025 FETAL NON-STRESS TEST: CPT | Performed by: OBSTETRICS & GYNECOLOGY

## 2022-07-14 PROCEDURE — G0378 HOSPITAL OBSERVATION PER HR: HCPCS

## 2022-07-14 PROCEDURE — 74011250637 HC RX REV CODE- 250/637: Performed by: OBSTETRICS & GYNECOLOGY

## 2022-07-14 PROCEDURE — 36415 COLL VENOUS BLD VENIPUNCTURE: CPT

## 2022-07-14 RX ORDER — FOLIC ACID/MULTIVIT,IRON,MINER 0.4MG-18MG
1 TABLET ORAL DAILY
Status: DISCONTINUED | OUTPATIENT
Start: 2022-07-15 | End: 2022-07-15 | Stop reason: HOSPADM

## 2022-07-14 RX ORDER — LANOLIN ALCOHOL/MO/W.PET/CERES
1 CREAM (GRAM) TOPICAL DAILY
Status: DISCONTINUED | OUTPATIENT
Start: 2022-07-15 | End: 2022-07-15 | Stop reason: HOSPADM

## 2022-07-14 RX ORDER — CALCIUM CARBONATE 200(500)MG
200 TABLET,CHEWABLE ORAL
Status: DISCONTINUED | OUTPATIENT
Start: 2022-07-14 | End: 2022-07-15 | Stop reason: HOSPADM

## 2022-07-14 RX ADMIN — ACETAMINOPHEN 650 MG: 325 TABLET ORAL at 19:34

## 2022-07-14 RX ADMIN — LABETALOL HYDROCHLORIDE 200 MG: 200 TABLET, FILM COATED ORAL at 09:46

## 2022-07-14 RX ADMIN — LABETALOL HYDROCHLORIDE 200 MG: 200 TABLET, FILM COATED ORAL at 21:26

## 2022-07-14 RX ADMIN — BETAMETHASONE SODIUM PHOSPHATE AND BETAMETHASONE ACETATE 12 MG: 3; 3 INJECTION, SUSPENSION INTRA-ARTICULAR; INTRALESIONAL; INTRAMUSCULAR at 15:23

## 2022-07-14 NOTE — PROGRESS NOTES
3333 Bedside and Verbal shift change report given to Marylu Morales RN (oncoming nurse) by JAZLYN Reina RN (offgoing nurse). Report included the following information SBAR, MAR and Med Rec Status. 1515 Entered room to find pt resting in bed. Pt denies pain. Pt states she'll have an occasional discomfort in the front of her head, but otherwise her headache is gone. Pt denies ctx, LOF, blurred vision, or epigastric pain.    0729 EFM applied. 8791 Pt up to shower. 4923 Pt out of shower. Camille Mendoza MD repeat labs CMP, CBC tomorrow morning, compression socks, GBS swab, and NST BID.     1458 Pt up to void. 1459 EFM reapplied per MD     1915 Bedside and Verbal shift change report given to LINDA Vargas RN (oncoming nurse) by Marylu Morales RN (offgoing nurse). Report included the following information SBAR, MAR and Med Rec Status.

## 2022-07-14 NOTE — PROGRESS NOTES
Patient with episode of floaters and \"heart burn\" in RUQ. Both symptoms have improved but still feels some discomfort in RUQ and occ floater. No HA. Good FM  No cp/sob. Visit Vitals  /81   Pulse 91   Temp 98.3 °F (36.8 °C)   Resp 17   Ht 5' 7\" (1.702 m)   SpO2 97%   BMI 30.23 kg/m²     Patient Vitals for the past 12 hrs:   Temp Pulse BP SpO2   22 1137 -- 91 122/81 --   22 1121 -- 92 139/88 --   22 1033 -- 94 (!) 137/93 --   22 1018 -- 91 139/88 --   22 1003 -- 91 (!) 132/92 --   22 0948 -- 91 (!) 136/95 --   22 0809 -- -- -- 97 %   22 0804 -- -- -- 97 %   22 0759 -- -- -- 96 %   22 0754 -- -- -- 97 %   22 0749 -- -- -- 97 %   22 0743 -- -- -- 97 %   22 0738 -- -- -- 97 %   22 0733 98.3 °F (36.8 °C) 85 (!) 145/86 98 %       Chest  · Respiratory Effort: breathing normal        Auscultation: normal breath sounds, clear bilaterally    Cardiovascular  · Heart:  · Auscultation: regular rate and rhythm without murmur, normal S1, S2    Ext no c/t/e    28 y.o..  34w1d  preeclampsia  PIH labs  Disc s/sx of severe preeclampsia  Pt prefers observation due to early gestation if able. Disc possible version vs IOL vs CS. Pt prefers attempt at [place]. Pt defers Fioricet at this time. On this date, 2022,  I have spent additional 20 minutes reviewing previous notes, test results and face to face with the patient discussing the diagnosis and importance of compliance with the treatment plan as well as documenting on the day of the visit.

## 2022-07-14 NOTE — PROGRESS NOTES
Ante Partum Progress Note    Elizabeth Hutchins  34w1d  LOS: 0      Patient states she has no new complaints. She report headache has resolved. No vision changes, HA, cp sob vision changes. Good FM. Feels good this morning after taking a shower. Vitals:  Patient Vitals for the past 24 hrs:   Temp Pulse Resp BP SpO2   22 2243 -- 92 17 131/84 99 %   22 2143 -- 96 17 (!) 143/85 99 %   22 -- (!) 103 17 136/84 97 %   22 -- 91 17 139/78 99 %   22 -- 93 15 117/77 98 %   22 1936 -- 86 18 130/86 96 %   22 -- 91 18 (!) 133/97 98 %   22 191 -- -- -- -- 98 %   22 191 -- -- -- -- 96 %   22 1906 -- 94 -- 124/83 --   22 1904 -- -- -- -- 96 %   22 1859 -- -- -- -- 96 %   22 1854 -- -- -- -- 97 %   22 1851 -- 90 16 123/74 --   22 1849 -- -- -- -- 95 %   22 1844 -- -- -- -- 97 %   22 1836 -- 90 16 133/89 --   22 1526 98.2 °F (36.8 °C) 93 16 131/88 --       Temp (24hrs), Av.2 °F (36.8 °C), Min:98.2 °F (36.8 °C), Max:98.2 °F (36.8 °C)        Last 24hr Input/Output:    Intake/Output Summary (Last 24 hours) at 2022 0900  Last data filed at 2022 2243  Gross per 24 hour   Intake 50 ml   Output 450 ml   Net -400 ml      ]    Exam:    General: Patient without distress.   Abdomen: soft, non-tender  Fundus: soft, gravid, non-tender  Extremities: no redness or tenderness or cords        Chest  · Respiratory Effort: breathing normal        Auscultation: normal breath sounds, clear bilaterally    Cardiovascular  · Heart:  · Auscultation: regular rate and rhythm without murmur, normal S1, S2        Labs:   Recent Results (from the past 24 hour(s))   PROTEIN, URINE, 24 HR    Collection Time: 22  3:00 PM   Result Value Ref Range    Period of collection 24 hr    Volume 3,350 mL    Protein,urine 24 hr 268 (H) <149 mg/24hr   CREATININE, UR, 24 HR    Collection Time: 22  3:00 PM   Result Value Ref Range    Period of collection 24 hr    Total volume, urine 3,350 mL    Creatinine, urine 24 hr 1,290 600 - 1,800 MG/24HR         Assessment:   28 y.o.  34w1d   Preeclampsia with ho HA, resolved  Ho migraine HA  RFS  GBS unknown  Transverse lie  Ho GHTN    Past Medical History:   Diagnosis Date    Anemia     Celiac disease     COVID-19 2021    Gestational hypertension     with 1st pregnancy    PIH (pregnancy induced hypertension)     Vitamin D deficiency 02/10/2022    17.5         Plan:  Continue hospitalization with hospitalized bedrest  GBS, obtain  Disc options of IOL vs close observation: will hold on IOL await BMZ x2 and reevaluate sx  Repeat PIH labs in am  Support hose  Disc process of IOL if needed  Reviewed GHTN vs mild and severe forms of preeclampsia and maternal and fetal risks  Complete BMZ course    On this date, 2022,  I have spent 20 minutes reviewing previous notes, MFM, examining the patient, reviewing test results and face to face time with the patient discussing the diagnosis, plan of care and importance of compliance with the treatment plan and perfroming an exam.  We reviewed the planned hospital course as well as documented on the day of the hospitalization. Jan Bella MD    NST Inpatient Procedure Note  ~7am    Elizabeth Hutchins presents for fetal non-stress test.    Indication is preeclampsia. She is 34w1d. She has been monitored for more than 60 minutes. The FHR was reactive. NST Interpretation:   FHR baseline 130-135 bpm,   Variability:  moderate  Accelerations:  present  Decelerations Absent. Uterine contractions:  absent    Assessment  NST is reactive. NST is reassuring. Patient does need admission/observation for further monitoring. Russell Emery was informed of the NST results and her questions were answered.     Plan:    [x] Continue admission on Labor and Delivery    [] Continue observation on Labor and Delivery   [] Keep routine OB follow up upon discharge   [] Reviewed fetal movement kick counts, notify MD if decreased   [] Continue continuous fetal monitoring.    []

## 2022-07-14 NOTE — PROGRESS NOTES
Visit Vitals  BP (!) 145/93   Pulse 94   Temp 98.3 °F (36.8 °C)   Resp 17   Ht 5' 7\" (1.702 m)   SpO2 97%   BMI 30.23 kg/m²     Recent Results (from the past 24 hour(s))   CBC WITH AUTOMATED DIFF    Collection Time: 07/14/22 12:02 PM   Result Value Ref Range    WBC 11.9 (H) 3.6 - 11.0 K/uL    RBC 3.73 (L) 3.80 - 5.20 M/uL    HGB 10.6 (L) 11.5 - 16.0 g/dL    HCT 32.1 (L) 35.0 - 47.0 %    MCV 86.1 80.0 - 99.0 FL    MCH 28.4 26.0 - 34.0 PG    MCHC 33.0 30.0 - 36.5 g/dL    RDW 12.7 11.5 - 14.5 %    PLATELET 714 084 - 341 K/uL    MPV 9.3 8.9 - 12.9 FL    NRBC 0.0 0  WBC    ABSOLUTE NRBC 0.00 0.00 - 0.01 K/uL    NEUTROPHILS 75 32 - 75 %    LYMPHOCYTES 17 12 - 49 %    MONOCYTES 7 5 - 13 %    EOSINOPHILS 0 0 - 7 %    BASOPHILS 0 0 - 1 %    IMMATURE GRANULOCYTES 1 (H) 0.0 - 0.5 %    ABS. NEUTROPHILS 9.0 (H) 1.8 - 8.0 K/UL    ABS. LYMPHOCYTES 2.0 0.8 - 3.5 K/UL    ABS. MONOCYTES 0.8 0.0 - 1.0 K/UL    ABS. EOSINOPHILS 0.0 0.0 - 0.4 K/UL    ABS. BASOPHILS 0.0 0.0 - 0.1 K/UL    ABS. IMM. GRANS. 0.1 (H) 0.00 - 0.04 K/UL    DF AUTOMATED     METABOLIC PANEL, COMPREHENSIVE    Collection Time: 07/14/22 12:02 PM   Result Value Ref Range    Sodium 138 136 - 145 mmol/L    Potassium 3.7 3.5 - 5.1 mmol/L    Chloride 107 97 - 108 mmol/L    CO2 19 (L) 21 - 32 mmol/L    Anion gap 12 5 - 15 mmol/L    Glucose 119 (H) 65 - 100 mg/dL    BUN 10 6 - 20 MG/DL    Creatinine 0.66 0.55 - 1.02 MG/DL    BUN/Creatinine ratio 15 12 - 20      GFR est AA >60 >60 ml/min/1.73m2    GFR est non-AA >60 >60 ml/min/1.73m2    Calcium 8.5 8.5 - 10.1 MG/DL    Bilirubin, total 0.2 0.2 - 1.0 MG/DL    ALT (SGPT) 13 12 - 78 U/L    AST (SGOT) 8 (L) 15 - 37 U/L    Alk. phosphatase 112 45 - 117 U/L    Protein, total 6.2 (L) 6.4 - 8.2 g/dL    Albumin 2.4 (L) 3.5 - 5.0 g/dL    Globulin 3.8 2.0 - 4.0 g/dL    A-G Ratio 0.6 (L) 1.1 - 2.2         Since symptoms and labs stable will continue hospital observation.   Consider delivery for persistent s/sx of severe preeclampsia. Plan of care d/w covering OB/GYN 7/15: Dr. Juvenal Serra and on call [facility] Dr. Nav Martinez.

## 2022-07-14 NOTE — PROGRESS NOTES
Neurology Progress Note    Patient ID:  Jose Pritchard  [id number]  59 y.o.  1990    CC: headache    Subjective:      Patient reports significant improvement of her headache. Brain MRI and MRV were unremarkable. Current Facility-Administered Medications   Medication Dose Route Frequency    butalbital-acetaminophen-caffeine (FIORICET, ESGIC) -40 mg per tablet 1 Tablet  1 Tablet Oral Q6H PRN    betamethasone (CELESTONE) injection 12 mg  12 mg IntraMUSCular Q24H    labetaloL (NORMODYNE) tablet 200 mg  200 mg Oral Q12H    zolpidem (AMBIEN) tablet 5 mg  5 mg Oral QHS PRN    acetaminophen (TYLENOL) tablet 650 mg  650 mg Oral Q4H PRN    diphenhydrAMINE (BENADRYL) capsule 25 mg  25 mg Oral Q6H PRN        Review of Systems:    Pertinent items are noted in HPI. Objective:     Patient Vitals for the past 8 hrs:   BP Temp Pulse SpO2   07/14/22 1033 (!) 137/93 -- 94 --   07/14/22 1018 139/88 -- 91 --   07/14/22 1003 (!) 132/92 -- 91 --   07/14/22 0948 (!) 136/95 -- 91 --   07/14/22 0809 -- -- -- 97 %   07/14/22 0804 -- -- -- 97 %   07/14/22 0759 -- -- -- 96 %   07/14/22 0754 -- -- -- 97 %   07/14/22 0749 -- -- -- 97 %   07/14/22 0743 -- -- -- 97 %   07/14/22 0738 -- -- -- 97 %   07/14/22 0733 (!) 145/86 98.3 °F (36.8 °C) 85 98 %       No intake/output data recorded. 07/12 1901 - 07/14 0700  In: 50 [I.V.:50]  Out: 450 [Urine:450]    Lab Review   Recent Results (from the past 24 hour(s))   PROTEIN, URINE, 24 HR    Collection Time: 07/13/22  3:00 PM   Result Value Ref Range    Period of collection 24 hr    Volume 3,350 mL    Protein,urine 24 hr 268 (H) <149 mg/24hr   CREATININE, UR, 24 HR    Collection Time: 07/13/22  3:00 PM   Result Value Ref Range    Period of collection 24 hr    Total volume, urine 3,350 mL    Creatinine, urine 24 hr 1,290 600 - 1,800 MG/24HR       PHYSICAL EXAM:    NEUROLOGICAL EXAM:    Appearance:   The patient is well developed, well nourished, provides a coherent history and is in no acute distress. Mental Status: Oriented to time, place and person. Fluent, no aphasia or dysarthria. Mood and affect appropriate. Cranial Nerves:   Intact visual fields. SOFIA, EOM's full, no nystagmus, no ptosis. Facial sensation is normal. Corneal reflexes are intact. Facial movement is symmetric. Hearing is normal bilaterally. Palate is midline with normal elevation. Sternocleidomastoid and trapezius muscles are normal. Tongue is midline. Motor:  5/5 strength in upper and lower proximal and distal muscles. Normal bulk and tone. No pronator drift. Reflexes:   Deep tendon reflexes were symmetrical. Downgoing toes. Sensory:   Normal to cold and vibration. Gait:  Not tested. Tremor:   No tremor noted. Cerebellar:  Intact FTN/JACKIE/HTS. Assessment:   Vascular headache    Plan:   Neurological examination was nonfocal.  Patient mainly complaining of mild headaches. Prior history of migraine headaches that were more severe. Current headaches related to hypertension. Brain MRI without contrast did not reveal any acute process. Essentially normal.    Brain MRV did not reveal any venous sinus thrombosis. Continue symptomatic treatment of headaches. No further recommendations from a neurological standpoint. Visit lasted 30 minutes. This included review of her medical records, evaluation of patient and formulation of treatment plan, all questions and concerns were answered.     Signed:  Jhonatan Simon MD  7/14/2022  11:17 AM

## 2022-07-14 NOTE — PROGRESS NOTES
1915 - Bedside and Verbal shift change report given to LINDA Vargas RN (oncoming nurse) by Álvaro Monet RN (offgoing nurse). Report included the following information SBAR, MAR and Med Rec Status. 1 - RN at pt bedside performing shift assessment and connecting pt to [facility] to obtain NST at this time. 1934 - Pt states she has a HA that is a \"1\" on a numeric 0-10 pain scale. Pt requesting tylenol. RN administering at this time. 2014 - RN at pt bedside disconnecting pt from [facility] at this time. NST obtained (reactive). 7984 - Bedside and Verbal shift change report given to Malcolm1 Jose Kline (oncoming nurse) by Rosa Marion RN (offgoing nurse). Report included the following information SBAR, MAR and Med Rec Status.

## 2022-07-15 VITALS
BODY MASS INDEX: 30.23 KG/M2 | RESPIRATION RATE: 16 BRPM | HEIGHT: 67 IN | HEART RATE: 83 BPM | OXYGEN SATURATION: 98 % | TEMPERATURE: 97.6 F | DIASTOLIC BLOOD PRESSURE: 95 MMHG | SYSTOLIC BLOOD PRESSURE: 135 MMHG

## 2022-07-15 PROCEDURE — G0378 HOSPITAL OBSERVATION PER HR: HCPCS

## 2022-07-15 PROCEDURE — 59025 FETAL NON-STRESS TEST: CPT | Performed by: OBSTETRICS & GYNECOLOGY

## 2022-07-15 PROCEDURE — 99224 PR SBSQ OBSERVATION CARE/DAY 15 MINUTES: CPT | Performed by: OBSTETRICS & GYNECOLOGY

## 2022-07-15 RX ORDER — BUTALBITAL, ACETAMINOPHEN AND CAFFEINE 50; 325; 40 MG/1; MG/1; MG/1
1 TABLET ORAL
Qty: 30 TABLET | Refills: 0 | Status: SHIPPED | OUTPATIENT
Start: 2022-07-15

## 2022-07-15 NOTE — PROGRESS NOTES
Antepartum Obstetrics Progress Note    Name: Arsenio Roblero MRN: [id number]  SSN: [id number]    YOB: 1990  Age: 28 y.o. Sex: female      Subjective:      LOS: 0 days    Estimated Date of Delivery: 22   Gestational Age Today: 35w2d     Patient admitted for GHTN, elevated BPs, HA. Feels MUCH better than she did on admission. No HA this AM.  Had mild HA last PM, took tylenol and went to sleep, no HA this AM.  Has some residual mild right frontal discomfort if she turns her head. No visual changes (had some floaters yesterday, none today). Had some LATANYA pain like heart burn. Rpt labs all nl. No CP/SOB/swelling    +FM, no VB/LOF/ctx    Objective:     Vitals:  Blood pressure (!) 135/95, pulse 83, temperature 97.6 °F (36.4 °C), resp. rate 16, height 5' 7\" (1.702 m), last menstrual period 2021, SpO2 98 %. Temp (24hrs), Av.1 °F (36.7 °C), Min:97.6 °F (36.4 °C), Max:98.4 °F (53.4 °C)    Systolic (89ITK), ZZE:023 , Min:122 , PYH:411      Diastolic (45JKW), AOY:73, Min:77, Max:95       Intake and Output:         Physical Exam:  Patient without distress.   Heart: Regular rate and rhythm or S1S2 present  Lung: clear to auscultation throughout lung fields, no wheezes, no rales, no rhonchi and normal respiratory effort  Abdomen: soft, nontender  Fundus: soft and non tender  Lower Extremities:  - Edema No   - No cords or calf tenderness.   - Patellar Reflexes: 2+ bilaterally   - Clonus: absent       Membranes:  Intact      Fetal Heart Rate:  Reactive  Baseline: 130 per minute  Variability: moderate  Accelerations: yes  Decelerations: had single, small variable, 20-30 sec, genoveva to 90s  Uterine contractions: had single contraction  30+ minute tracing reviewed        Labs:   Recent Results (from the past 36 hour(s))   CBC WITH AUTOMATED DIFF    Collection Time: 22 12:02 PM   Result Value Ref Range    WBC 11.9 (H) 3.6 - 11.0 K/uL    RBC 3.73 (L) 3.80 - 5.20 M/uL    HGB 10.6 (L) 11.5 - 16.0 g/dL    HCT 32.1 (L) 35.0 - 47.0 %    MCV 86.1 80.0 - 99.0 FL    MCH 28.4 26.0 - 34.0 PG    MCHC 33.0 30.0 - 36.5 g/dL    RDW 12.7 11.5 - 14.5 %    PLATELET 236 964 - 889 K/uL    MPV 9.3 8.9 - 12.9 FL    NRBC 0.0 0  WBC    ABSOLUTE NRBC 0.00 0.00 - 0.01 K/uL    NEUTROPHILS 75 32 - 75 %    LYMPHOCYTES 17 12 - 49 %    MONOCYTES 7 5 - 13 %    EOSINOPHILS 0 0 - 7 %    BASOPHILS 0 0 - 1 %    IMMATURE GRANULOCYTES 1 (H) 0.0 - 0.5 %    ABS. NEUTROPHILS 9.0 (H) 1.8 - 8.0 K/UL    ABS. LYMPHOCYTES 2.0 0.8 - 3.5 K/UL    ABS. MONOCYTES 0.8 0.0 - 1.0 K/UL    ABS. EOSINOPHILS 0.0 0.0 - 0.4 K/UL    ABS. BASOPHILS 0.0 0.0 - 0.1 K/UL    ABS. IMM. GRANS. 0.1 (H) 0.00 - 0.04 K/UL    DF AUTOMATED     METABOLIC PANEL, COMPREHENSIVE    Collection Time: 22 12:02 PM   Result Value Ref Range    Sodium 138 136 - 145 mmol/L    Potassium 3.7 3.5 - 5.1 mmol/L    Chloride 107 97 - 108 mmol/L    CO2 19 (L) 21 - 32 mmol/L    Anion gap 12 5 - 15 mmol/L    Glucose 119 (H) 65 - 100 mg/dL    BUN 10 6 - 20 MG/DL    Creatinine 0.66 0.55 - 1.02 MG/DL    BUN/Creatinine ratio 15 12 - 20      GFR est AA >60 >60 ml/min/1.73m2    GFR est non-AA >60 >60 ml/min/1.73m2    Calcium 8.5 8.5 - 10.1 MG/DL    Bilirubin, total 0.2 0.2 - 1.0 MG/DL    ALT (SGPT) 13 12 - 78 U/L    AST (SGOT) 8 (L) 15 - 37 U/L    Alk. phosphatase 112 45 - 117 U/L    Protein, total 6.2 (L) 6.4 - 8.2 g/dL    Albumin 2.4 (L) 3.5 - 5.0 g/dL    Globulin 3.8 2.0 - 4.0 g/dL    A-G Ratio 0.6 (L) 1.1 - 2.2         Assessment and Plan: Active Problems:    Pregnancy (2022)       31yo  @ 34+2. GHTN. Admitted for HA, elevated BPs. Cleared by neuro  No MOYER today, feels MUCH better. Labs nl. BPs only mildly elevated. - will rpt NST  - if NST reactive, then OK for discharge home  - has [facility] appt with Dr. Seth Rodriguez scheduled for Monday  - has home BP cuff, will monitor  - lives nearby,  works from home, will not be unattended.   - reviewed BP parameters, s/sx to call/RTLD for eval    Signed By: Marta Hill MD     July 15, 2022

## 2022-07-15 NOTE — PROGRESS NOTES
NST Inpatient procedure note    Kofi Neri presents for fetal non-stress test.  LMP was on   Indication is GHTN. She is 34w2d. She has been monitored for 90 minutes. The FHR was reactive. NST Interpretation:    FHR baseline 130-135 bpm, variability moderate. Accelerations present. Decelerations Absent. Uterine contractions:  occasional    Assessment    NST is reactive.

## 2022-07-15 NOTE — PROGRESS NOTES
8171: Bedside and Verbal shift change report given to [address] (oncoming nurse) by Emilie Carroll RN (offgoing nurse). Report included the following information SBAR, MAR and Med Rec Status. 0725:  Performing pt assessment and connecting EFM at this time. Pt denies HA, blurry vision, discharge. Pt states that she continues to have epigastric discomfort. 0800: NST verified with Delfino Arroyo RN    1000: NST verified with RITA SWANSON SALAZAR [facility]. Pt ok to discharge per Dr. Prabhakar Points. Pt refuses morning medications stating that she will take all medications at home when discharged. 1019: Discharge paperwork reviewed with pt.  IV removed    1024: Pt ambulates off unit

## 2022-07-15 NOTE — DISCHARGE INSTRUCTIONS
Patient Education   Patient Education   Patient Education        Weeks 34 to 39 of Your Pregnancy: Care Instructions  Overview     By now, your baby and your belly have grown quite large. It's almost time to give birth! Your baby's lungs are almost ready to breathe air. The skull bones are firm enough to protect your baby's head, but soft enough to move down through the birth canal.  You may be feeling excited and happy at times--but also anxious or scared. You might wonder how you'll know if you're in labor or what to expect during labor. Try to be open and flexible in your expectations of the birth. Because each birth is different, there's no way to know exactly what childbirth will be like for you. Talk to your doctor or midwife about any concerns you have. If you haven't already had the Tdap shot during this pregnancy, talk to your doctor about getting it. It will help protect your  against pertussis infection. In the 36th week, you'll probably have a test for group B streptococcus (GBS). GBS is a common type of bacteria that can live in the vagina and rectum. It can make your baby sick after birth. If you test positive, you will get antibiotics during labor. The medicine will help keep your baby from getting the bacteria. Follow-up care is a key part of your treatment and safety. Be sure to make and go to all appointments, and call your doctor if you are having problems. It's also a good idea to know your test results and keep a list of the medicines you take. How can you care for yourself at home? Learn about pain relief choices  · Pain is different for everyone. Talk with your doctor about your feelings about pain. · You can choose from several types of pain relief. These include medicine, breathing techniques, and comfort measures. You can use more than one option. · If you choose to have pain medicine during labor, talk to your doctor about your options.  Some medicines lower anxiety and help with some of the pain. Others make your lower body numb so that you won't feel pain. · Be sure to tell your doctor about your pain medicine choice before you start labor or very early in your labor. You may be able to change your mind as labor progresses. Labor and delivery  · The first stage of labor has three parts: early, active, and transition. ? It's common to have early labor at home. You can stay busy or rest, eat light snacks, drink clear fluids, and start counting contractions. ? When talking during a contraction gets hard, you may be moving to active labor. During active labor, you should head for the hospital if you aren't there already. ? You are in active labor when contractions come every 3 to 4 minutes and last about 60 seconds. Your cervix is opening more rapidly. ? If your water breaks, contractions will come faster and stronger. ? During transition, your cervix is stretching, and contractions are coming more rapidly. ? You may want to push, but your cervix might not be ready. Your doctor will tell you when to push. · The second stage starts when your cervix is completely opened and you are ready to push. ? Contractions are very strong to push the baby down the birth canal.  ? You will probably feel the urge to push. You may feel like you need to have a bowel movement. ? You may be coached to push with contractions. These contractions will be very strong, but you won't have them as often. You can get a little rest between contractions. ? One last push, and your baby is born. · The third stage is when a few more contractions push out the placenta. This may take 30 minutes or less. Where can you learn more? Go to http://francisco j-suasnne.info/  Enter B912 in the search box to learn more about \"Weeks 34 to 36 of Your Pregnancy: Care Instructions. \"  Current as of: June 16, 2021               Content Version: 13.2  © 9070-0102 [employer]wise[employer].    Care instructions adapted under license by [employer] (which disclaims liability or warranty for this information). If you have questions about a medical condition or this instruction, always ask your healthcare professional. [address] any warranty or liability for your use of this information. High Blood Pressure in Pregnancy: Care Instructions  Overview     High blood pressure (hypertension) means that the force of blood against your artery walls is too strong. High blood pressure problems during pregnancy include:  · Chronic hypertension. This is high blood pressure that starts before pregnancy. · Gestational hypertension. This is high blood pressure that starts in the second or third trimester of pregnancy. · Preeclampsia. This is a problem that includes high blood pressure and signs of organ injury during pregnancy. In some cases, it leads to eclampsia. Eclampsia causes seizures. High blood pressure during pregnancy can affect the amount of oxygen and nutrients your baby receives. This can affect how your baby grows. High blood pressure can also cause other serious problems for both you and your baby, such as placental abruption. To prevent problems, you and your baby will be watched very closely. You will have to check your blood pressure often during pregnancy and possibly after pregnancy. If your blood pressure rises suddenly or is very high during your pregnancy, your doctor may prescribe medicines. They can usually control blood pressure. If your blood pressure affects your or your baby's health, your doctor may need to deliver your baby early. After your baby is born, your blood pressure will probably improve. But sometimes blood pressure problems continue after birth. Follow-up care is a key part of your treatment and safety. Be sure to make and go to all appointments, and call your doctor if you are having problems.  It's also a good idea to know your test results and keep a list of the medicines you take. How can you care for yourself at home? · Take and write down your blood pressure at home if your doctor says to. · Take your medicines exactly as prescribed. Call your doctor if you think you are having a problem with your medicine. · Do not smoke. This is one of the best things you can do to help your baby be healthy. If you need help quitting, talk to your doctor about stop-smoking programs and medicines. These can increase your chances of quitting for good. · Don't gain too much weight during pregnancy. Talk to your doctor about how much weight gain is healthy. · Eat a healthy diet. · If your doctor says it's okay, get regular exercise. Walking or swimming several times a week can be healthy for you and your baby. · Reduce stress, and find time to relax. This is very important if you continue to work or have a busy schedule. It's also important if you have small children at home. When should you call for help? Share this information with your partner or a friend. They can help you watch for warning signs. Call 911  anytime you think you may need emergency care. For example, call if:    · You passed out (lost consciousness).     · You have a seizure. Call your doctor now or seek immediate medical care if:    · You have symptoms of preeclampsia, such as:  ? Sudden swelling of your face, hands, or feet. ? New vision problems (such as dimness, blurring, or seeing spots). ? A severe headache.     · Your blood pressure is very high, such as 160/110 or higher.     · Your blood pressure is higher than your doctor told you it should be, or it rises quickly.     · You have new nausea or vomiting.     · You think that you are in labor.     · You have pain in your belly or pelvis. Watch closely for changes in your health, and be sure to contact your doctor if:    · You gain weight rapidly. Where can you learn more?   Go to http://www.gray.com/  Enter [id number] in the search box to learn more about \"High Blood Pressure in Pregnancy: Care Instructions. \"  Current as of: June 16, 2021               Content Version: 13.2  © 4037-5861 [employer]. Care instructions adapted under license by [employer] (which disclaims liability or warranty for this information). If you have questions about a medical condition or this instruction, always ask your healthcare professional. [address] any warranty or liability for your use of this information. Counting Your Baby's Kicks: Care Instructions  Overview     Counting your baby's kicks is one way your doctor can tell that your baby is healthy. Most women--especially in a first pregnancy--feel their baby move for the first time between 16 and 22 weeks. The movement may feel like flutters rather than kicks. Your baby may move more at certain times of the day. When you are active, you may notice less kicking than when you are resting. At your prenatal visits, your doctor will ask whether the baby is active. In your last trimester, your doctor may ask you to count the number of times you feel your baby move. Follow-up care is a key part of your treatment and safety. Be sure to make and go to all appointments, and call your doctor if you are having problems. It's also a good idea to know your test results and keep a list of the medicines you take. How do you count fetal kicks? · A common method of checking your baby's movement is to note the length of time it takes to count ten movements (such as kicks, flutters, or rolls). · Pick your baby's most active time of day to count. This may be any time from morning to evening. · If you don't feel 10 movements in an hour, have something to eat or drink and count for another hour. If you don't feel at least 10 movements in the 2-hour period, call your doctor.   When should you call for help? Call your doctor now or seek immediate medical care if:    · You noticed that your baby has stopped moving or is moving much less than normal.   Watch closely for changes in your health, and be sure to contact your doctor if you have any problems. Where can you learn more? Go to http://www.gray.com/  Enter Q286704 in the search box to learn more about \"Counting Your Baby's Kicks: Care Instructions. \"  Current as of: June 16, 2021               Content Version: 13.2  © 2006-2022 [employer]. Care instructions adapted under license by [employer] (which disclaims liability or warranty for this information). If you have questions about a medical condition or this instruction, always ask your healthcare professional. [address] any warranty or liability for your use of this information.

## 2022-07-17 LAB
BACTERIA SPEC CULT: NORMAL
SERVICE CMNT-IMP: NORMAL

## 2022-07-18 ENCOUNTER — ROUTINE PRENATAL (OUTPATIENT)
Dept: OBGYN CLINIC | Age: 32
End: 2022-07-18
Payer: COMMERCIAL

## 2022-07-18 VITALS
SYSTOLIC BLOOD PRESSURE: 145 MMHG | WEIGHT: 195.8 LBS | BODY MASS INDEX: 30.67 KG/M2 | DIASTOLIC BLOOD PRESSURE: 108 MMHG

## 2022-07-18 DIAGNOSIS — O13.3 GESTATIONAL HYPERTENSION, THIRD TRIMESTER: ICD-10-CM

## 2022-07-18 DIAGNOSIS — Z3A.34 34 WEEKS GESTATION OF PREGNANCY: Primary | ICD-10-CM

## 2022-07-18 PROCEDURE — 0502F SUBSEQUENT PRENATAL CARE: CPT | Performed by: OBSTETRICS & GYNECOLOGY

## 2022-07-18 RX ORDER — LABETALOL 200 MG/1
300 TABLET, FILM COATED ORAL EVERY 8 HOURS
Qty: 60 TABLET | Refills: 3 | Status: SHIPPED | OUTPATIENT
Start: 2022-07-18 | End: 2022-08-03

## 2022-07-18 NOTE — PROGRESS NOTES
Feels ok. Had a mild ha this am resolved. BP earlier this am 130/81 & 144/93 at 115pm   No swelling. DTR 2+/4 no clonus. No proteinuria. Labs reviewed    Increase labetolol to 300 TID. Continue Bedrest.    Call us with BP report tomorrow aft       Problems  Dating by LMP=1st Trimester US  Vitamin D Def  History of Gestation HTN G1 taking Baby ASA  Boy -- Franchester Halim  US 19w6d = 20w4d 85% poor views of RVOT and Foot ? SUA rescan 28w -- Has appt 5/31  US 27w6d = 27w5d 43% Hadlock.   RVOT seen SUA -- Rescan growth 3 wk PNC  US 32w0d = 32w6d 33% hadlock.

## 2022-07-19 ENCOUNTER — TELEPHONE (OUTPATIENT)
Dept: OBGYN CLINIC | Age: 32
End: 2022-07-19

## 2022-07-19 NOTE — TELEPHONE ENCOUNTER
Incoming call received from patient. Name and  verified. Patient calling to give Dr Ludin Kaiser an update on her blood pressure. Patient states that blood pressure is 120's/80's.

## 2022-07-22 ENCOUNTER — HOSPITAL ENCOUNTER (OUTPATIENT)
Dept: PERINATAL CARE | Age: 32
Discharge: HOME | End: 2022-07-22
Attending: OBSTETRICS & GYNECOLOGY
Payer: COMMERCIAL

## 2022-07-22 PROCEDURE — 76819 FETAL BIOPHYS PROFIL W/O NST: CPT | Performed by: OBSTETRICS & GYNECOLOGY

## 2022-07-24 ENCOUNTER — HOSPITAL ENCOUNTER (EMERGENCY)
Age: 32
Discharge: HOME | End: 2022-07-24
Attending: OBSTETRICS & GYNECOLOGY | Admitting: OBSTETRICS & GYNECOLOGY
Payer: COMMERCIAL

## 2022-07-24 VITALS
HEIGHT: 67 IN | OXYGEN SATURATION: 97 % | TEMPERATURE: 98 F | BODY MASS INDEX: 30.61 KG/M2 | WEIGHT: 195 LBS | DIASTOLIC BLOOD PRESSURE: 83 MMHG | SYSTOLIC BLOOD PRESSURE: 135 MMHG | RESPIRATION RATE: 16 BRPM | HEART RATE: 87 BPM

## 2022-07-24 LAB
APPEARANCE UR: CLEAR
BACTERIA URNS QL MICRO: NEGATIVE /HPF
BILIRUB UR QL: NEGATIVE
COLOR UR: ABNORMAL
EPITH CASTS URNS QL MICRO: ABNORMAL /LPF
GLUCOSE UR STRIP.AUTO-MCNC: NEGATIVE MG/DL
HGB UR QL STRIP: NEGATIVE
HYALINE CASTS URNS QL MICRO: ABNORMAL /LPF (ref 0–2)
KETONES UR QL STRIP.AUTO: NEGATIVE MG/DL
LEUKOCYTE ESTERASE UR QL STRIP.AUTO: NEGATIVE
NITRITE UR QL STRIP.AUTO: NEGATIVE
PH UR STRIP: 7.5 [PH] (ref 5–8)
PROT UR STRIP-MCNC: NEGATIVE MG/DL
RBC #/AREA URNS HPF: ABNORMAL /HPF (ref 0–5)
SP GR UR REFRACTOMETRY: <1.005 (ref 1–1.03)
UA: UC IF INDICATED,UAUC: ABNORMAL
UROBILINOGEN UR QL STRIP.AUTO: 0.2 EU/DL (ref 0.2–1)
WBC URNS QL MICRO: ABNORMAL /HPF (ref 0–4)

## 2022-07-24 PROCEDURE — 81001 URINALYSIS AUTO W/SCOPE: CPT

## 2022-07-24 PROCEDURE — 99212 OFFICE O/P EST SF 10 MIN: CPT | Performed by: OBSTETRICS & GYNECOLOGY

## 2022-07-24 PROCEDURE — 59025 FETAL NON-STRESS TEST: CPT | Performed by: OBSTETRICS & GYNECOLOGY

## 2022-07-24 PROCEDURE — 99285 EMERGENCY DEPT VISIT HI MDM: CPT

## 2022-07-24 PROCEDURE — 59025 FETAL NON-STRESS TEST: CPT

## 2022-07-24 NOTE — PROGRESS NOTES
Pt arrives to unit from home with c/o contractions that are lasting one minute and are about four minutes apart. Pt states the contractions woke her up out of her sleep around 0130. Pt denies leakage/loss of fluid, vaginal bleeding, vision changes, and epigastric pain. Pt endorses positive fetal movement and occasional HA. Pt currently takes labetalol 300 mg PO q8h for her BP. Pt oriented to room, given call bell, and instructed to change into pt gown. Carlyn - RN performing SVE (3/70/-2). 0 - SBAR report given to DREW Barrett RN.

## 2022-07-24 NOTE — PROGRESS NOTES
Ante Partum Progress Note    Elizabeth Hutchins  35w4d        Patient states she continues to feel contractions but they are \"the same\", not stronger or more frequent. Vitals:  Visit Vitals  /83   Pulse 87   Temp 98 °F (36.7 °C)   Resp 16   Ht 5' 7\" (1.702 m)   Wt 195 lb (88.5 kg)   LMP 2021 (Exact Date)   SpO2 97%   BMI 30.54 kg/m²     Temp (24hrs), Av °F (36.7 °C), Min:98 °F (36.7 °C), Max:98 °F (36.7 °C)      Last 24hr Input/Output:  No intake or output data in the 24 hours ending 22 0940     Non stress test:  Reactive, baseline 150, moderate variability, + accels, no decels, q5 min contractions, monitoroed > 30  minutes    Uterine Activity: q5 min    Exam:  Patient without distress. Abdomen, fundus soft non-tender     Extremities, no redness or tenderness           Cervix 30/3/-3      Assessment/Plan: 35w4d with painful uterine contractions. No evidence of active PTL. Cervix unchanged after >4 hours of observation. Will d/c to home. Follow-up already scheduled for tomorrow. GHTN- BPS normal or mild range (did not take labetalol this morning). Strict PIH precautions discussed.

## 2022-07-24 NOTE — PROGRESS NOTES
0720: Bedside and Verbal shift change report given to DREW Peterson RN (oncoming nurse) by LINDA Vargas RN (offgoing nurse). Report included the following information SBAR, Kardex, Intake/Output, MAR, Accordion, Recent Results, and Med Rec Status. 9283: MD Arely Willard updated on patient status. TORB MD Arely Willard to send UA.    5726: MD Cady Haque at bedside, SVE performed patient remains the same as previous check. VORB okay to discharge. 78: I have reviewed discharge instructions with the patient and spouse. The patient and spouse verbalized understanding. Patient signed paper AVS in chart. Patient ambulated off unit at this time.

## 2022-07-24 NOTE — DISCHARGE INSTRUCTIONS
Counting Your Baby's Kicks: Care Instructions  Overview     Counting your baby's kicks is one way your doctor can tell that your baby is healthy. Most women--especially in a first pregnancy--feel their baby move for the first time between 16 and 22 weeks. The movement may feel like flutters rather than kicks. Your baby may move more at certain times of the day. When you are active, you may notice less kicking than when you are resting. At your prenatal visits, your doctor will ask whether the baby is active. In your last trimester, your doctor may ask you to count the number of times you feel your baby move. Follow-up care is a key part of your treatment and safety. Be sure to make and go to all appointments, and call your doctor if you are having problems. It's also a good idea to know your test results and keep a list of the medicines you take. How do you count fetal kicks? A common method of checking your baby's movement is to note the length of time it takes to count ten movements (such as kicks, flutters, or rolls). Pick your baby's most active time of day to count. This may be any time from morning to evening. If you don't feel 10 movements in an hour, have something to eat or drink and count for another hour. If you don't feel at least 10 movements in the 2-hour period, call your doctor. When should you call for help? Call your doctor now or seek immediate medical care if:    You noticed that your baby has stopped moving or is moving much less than normal.   Watch closely for changes in your health, and be sure to contact your doctor if you have any problems. Where can you learn more? Go to http://www.gray.com/  Enter [id number] in the search box to learn more about \"Counting Your Baby's Kicks: Care Instructions. \"  Current as of: June 16, 2021               Content Version: 13.2  © 3170-1298 Healthwise, [employer]mo.    Care instructions adapted under license by [facility] (which disclaims liability or warranty for this information). If you have questions about a medical condition or this instruction, always ask your healthcare professional. [address] any warranty or liability for your use of this information. Pregnancy Precautions: Care Instructions  Your Care Instructions     There is no sure way to prevent labor before your due date ( labor) or to prevent most other pregnancy problems. But there are things you can do to increase your chances of a healthy pregnancy. Go to your appointments, follow your doctor's advice, and take good care of yourself. Eat well, and exercise (if your doctor agrees). And make sure to drink plenty of water. Follow-up care is a key part of your treatment and safety. Be sure to make and go to all appointments, and call your doctor if you are having problems. It's also a good idea to know your test results and keep a list of the medicines you take. How can you care for yourself at home? Make sure you go to your prenatal appointments. At each visit, your doctor will check your blood pressure. Your doctor will also check to see if you have protein in your urine. High blood pressure and protein in urine are signs of preeclampsia. This condition can be dangerous for you and your baby. Drink plenty of fluids. Dehydration can cause contractions. If you have kidney, heart, or liver disease and have to limit fluids, talk with your doctor before you increase the amount of fluids you drink. Tell your doctor right away if you notice any symptoms of an infection, such as:  Burning when you urinate. A foul-smelling discharge from your vagina. Vaginal itching. Unexplained fever. Unusual pain or soreness in your uterus or lower belly. Eat a balanced diet. Include plenty of foods that are high in calcium and iron. Foods high in calcium include milk, cheese, yogurt, almonds, and broccoli.   Foods high in iron include red meat, shellfish, poultry, eggs, beans, raisins, whole-grain bread, and leafy green vegetables. Do not smoke. If you need help quitting, talk to your doctor about stop-smoking programs and medicines. These can increase your chances of quitting for good. Do not drink alcohol or use marijuana or illegal drugs. Follow your doctor's directions about activity. Your doctor will let you know how much, if any, exercise you can do. Ask your doctor if you can have sex. If you are at risk for early labor, your doctor may ask you to not have sex. Take care to prevent falls. During pregnancy, your joints are loose, and your balance is off. Sports such as bicycling, skiing, or in-line skating can increase your risk of falling. And don't ride horses or motorcycles, dive, water ski, scuba dive, or parachute jump while you are pregnant. Avoid things that can make your body too hot and may be harmful to your baby, such as a hot tub or sauna. Or talk with your doctor before doing anything that raises your body temperature. Your doctor can tell you if it's safe. Do not take any over-the-counter or herbal medicines or supplements without talking to your doctor or pharmacist first.  When should you call for help? Call 911  anytime you think you may need emergency care. For example, call if:    You passed out (lost consciousness). You have a seizure. You have severe vaginal bleeding. You have severe pain in your belly or pelvis. You have had fluid gushing or leaking from your vagina and you know or think the umbilical cord is bulging into your vagina. If this happens, immediately get down on your knees so your rear end (buttocks) is higher than your head. This will decrease the pressure on the cord until help arrives. Call your doctor now or seek immediate medical care if:    You have signs of preeclampsia, such as:  Sudden swelling of your face, hands, or feet.   New vision problems (such as dimness, blurring, or seeing spots). A severe headache. You have any vaginal bleeding. You have belly pain or cramping. You have a fever. You have had regular contractions (with or without pain) for an hour. This means that you have 8 or more within 1 hour or 4 or more in 20 minutes after you change your position and drink fluids. You have a sudden release of fluid from your vagina. You have low back pain or pelvic pressure that does not go away. You notice that your baby has stopped moving or is moving much less than normal.   Watch closely for changes in your health, and be sure to contact your doctor if you have any problems. Where can you learn more? Go to http://www.belle.com/  Enter Y951 in the search box to learn more about \"Pregnancy Precautions: Care Instructions. \"  Current as of: June 16, 2021               Content Version: 13.2  © 5291-6419 GoodyTag. Care instructions adapted under license by [employer] (which disclaims liability or warranty for this information). If you have questions about a medical condition or this instruction, always ask your healthcare professional. [address] any warranty or liability for your use of this information.

## 2022-07-25 ENCOUNTER — ROUTINE PRENATAL (OUTPATIENT)
Dept: OBGYN CLINIC | Age: 32
End: 2022-07-25
Payer: COMMERCIAL

## 2022-07-25 VITALS — DIASTOLIC BLOOD PRESSURE: 100 MMHG | WEIGHT: 197 LBS | SYSTOLIC BLOOD PRESSURE: 162 MMHG | BODY MASS INDEX: 30.85 KG/M2

## 2022-07-25 DIAGNOSIS — Z3A.35 35 WEEKS GESTATION OF PREGNANCY: Primary | ICD-10-CM

## 2022-07-25 DIAGNOSIS — O13.3 GESTATIONAL HYPERTENSION, THIRD TRIMESTER: ICD-10-CM

## 2022-07-25 PROCEDURE — 0502F SUBSEQUENT PRENATAL CARE: CPT | Performed by: OBSTETRICS & GYNECOLOGY

## 2022-07-25 NOTE — PROGRESS NOTES
Doing ok. On Labetolol 300 TID. BP noted. Bps at home: 140/90  Observing Rest AMAP  Minimal edema. DTR 2+/4 No clonus  Plan delivery if Bps require additional antihypertensive. Contractions more frequent   GBS neg  FU 4 days      Problems  Dating by LMP=1st Trimester US  Vitamin D Def  History of Gestation HTN G1 taking Baby ASA  Boy -- Dayan Lombardo  US 19w6d = 20w4d 85% poor views of RVOT and Foot ? SUA rescan 28w -- Has appt 5/31  US 27w6d = 27w5d 43% Hadlock. RVOT seen SUA -- Rescan growth 3 wk PNC  US 32w0d = 32w6d 33% hadlock.

## 2022-07-27 ENCOUNTER — HOSPITAL ENCOUNTER (EMERGENCY)
Age: 32
Discharge: HOME | End: 2022-07-27
Attending: OBSTETRICS & GYNECOLOGY | Admitting: OBSTETRICS & GYNECOLOGY
Payer: COMMERCIAL

## 2022-07-27 ENCOUNTER — TELEPHONE (OUTPATIENT)
Dept: OBGYN CLINIC | Age: 32
End: 2022-07-27

## 2022-07-27 VITALS
TEMPERATURE: 98.1 F | OXYGEN SATURATION: 98 % | SYSTOLIC BLOOD PRESSURE: 148 MMHG | HEIGHT: 67 IN | HEART RATE: 102 BPM | BODY MASS INDEX: 30.61 KG/M2 | RESPIRATION RATE: 16 BRPM | WEIGHT: 195 LBS | DIASTOLIC BLOOD PRESSURE: 91 MMHG

## 2022-07-27 LAB
ALBUMIN SERPL-MCNC: 2.5 G/DL (ref 3.5–5)
ALBUMIN/GLOB SERPL: 0.6 {RATIO} (ref 1.1–2.2)
ALP SERPL-CCNC: 126 U/L (ref 45–117)
ALT SERPL-CCNC: 15 U/L (ref 12–78)
ANION GAP SERPL CALC-SCNC: 8 MMOL/L (ref 5–15)
AST SERPL-CCNC: 10 U/L (ref 15–37)
BASOPHILS # BLD: 0 K/UL (ref 0–0.1)
BASOPHILS NFR BLD: 0 % (ref 0–1)
BILIRUB SERPL-MCNC: 0.2 MG/DL (ref 0.2–1)
BUN SERPL-MCNC: 12 MG/DL (ref 6–20)
BUN/CREAT SERPL: 23 (ref 12–20)
CALCIUM SERPL-MCNC: 9.5 MG/DL (ref 8.5–10.1)
CHLORIDE SERPL-SCNC: 108 MMOL/L (ref 97–108)
CO2 SERPL-SCNC: 20 MMOL/L (ref 21–32)
CREAT SERPL-MCNC: 0.53 MG/DL (ref 0.55–1.02)
CREAT UR-MCNC: <13 MG/DL
DIFFERENTIAL METHOD BLD: ABNORMAL
EOSINOPHIL # BLD: 0.2 K/UL (ref 0–0.4)
EOSINOPHIL NFR BLD: 1 % (ref 0–7)
ERYTHROCYTE [DISTWIDTH] IN BLOOD BY AUTOMATED COUNT: 13.1 % (ref 11.5–14.5)
GLOBULIN SER CALC-MCNC: 4 G/DL (ref 2–4)
GLUCOSE SERPL-MCNC: 84 MG/DL (ref 65–100)
HCT VFR BLD AUTO: 33.9 % (ref 35–47)
HGB BLD-MCNC: 11.5 G/DL (ref 11.5–16)
IMM GRANULOCYTES # BLD AUTO: 0.1 K/UL (ref 0–0.04)
IMM GRANULOCYTES NFR BLD AUTO: 1 % (ref 0–0.5)
LYMPHOCYTES # BLD: 1.6 K/UL (ref 0.8–3.5)
LYMPHOCYTES NFR BLD: 14 % (ref 12–49)
MCH RBC QN AUTO: 28.9 PG (ref 26–34)
MCHC RBC AUTO-ENTMCNC: 33.9 G/DL (ref 30–36.5)
MCV RBC AUTO: 85.2 FL (ref 80–99)
MONOCYTES # BLD: 0.7 K/UL (ref 0–1)
MONOCYTES NFR BLD: 5 % (ref 5–13)
NEUTS SEG # BLD: 9.4 K/UL (ref 1.8–8)
NEUTS SEG NFR BLD: 79 % (ref 32–75)
NRBC # BLD: 0 K/UL (ref 0–0.01)
NRBC BLD-RTO: 0 PER 100 WBC
PLATELET # BLD AUTO: 343 K/UL (ref 150–400)
PMV BLD AUTO: 9.4 FL (ref 8.9–12.9)
POTASSIUM SERPL-SCNC: 4.3 MMOL/L (ref 3.5–5.1)
PROT SERPL-MCNC: 6.5 G/DL (ref 6.4–8.2)
PROT UR-MCNC: 5 MG/DL (ref 0–11.9)
PROT/CREAT UR-RTO: <0.4
RBC # BLD AUTO: 3.98 M/UL (ref 3.8–5.2)
SODIUM SERPL-SCNC: 136 MMOL/L (ref 136–145)
WBC # BLD AUTO: 11.9 K/UL (ref 3.6–11)

## 2022-07-27 PROCEDURE — 59025 FETAL NON-STRESS TEST: CPT

## 2022-07-27 PROCEDURE — 84156 ASSAY OF PROTEIN URINE: CPT

## 2022-07-27 PROCEDURE — 99213 OFFICE O/P EST LOW 20 MIN: CPT | Performed by: OBSTETRICS & GYNECOLOGY

## 2022-07-27 PROCEDURE — 99285 EMERGENCY DEPT VISIT HI MDM: CPT

## 2022-07-27 PROCEDURE — 36415 COLL VENOUS BLD VENIPUNCTURE: CPT

## 2022-07-27 PROCEDURE — 80053 COMPREHEN METABOLIC PANEL: CPT

## 2022-07-27 PROCEDURE — 85025 COMPLETE CBC W/AUTO DIFF WBC: CPT

## 2022-07-27 NOTE — DISCHARGE INSTRUCTIONS
Metacloud allows you to send messages to your doctor, view your test results, renew your prescriptions, schedule appointments, and more. To sign up, go to [email or web address] and click on the Sign Up Now link in the New User? box. Enter your MAPPINGt Activation Code exactly as it appears below along with the last four digits of your Social Security Number and your Date of Birth to complete the sign-up process. If you do not sign up before the expiration date, you must request a new code. MAPPINGt Activation Code: Activation code not generated  Current Metacloud Status: Active    If you have questions, you can e-mail [email or web address]   or call [phone]   to talk to our [address] staff. Remember, Metacloud is NOT to be used for urgent needs. For medical emergencies, dial 911. Counting Your Baby's Kicks: Care Instructions  Overview     Counting your baby's kicks is one way your doctor can tell that your baby is healthy. Most women--especially in a first pregnancy--feel their baby move for the first time between 16 and 22 weeks. The movement may feel like flutters rather than kicks. Your baby may move more at certain times of the day. When you are active, you may notice less kicking than when you are resting. At your prenatal visits, your doctor will ask whether the baby is active. In your last trimester, your doctor may ask you to count the number of times you feel your baby move. Follow-up care is a key part of your treatment and safety. Be sure to make and go to all appointments, and call your doctor if you are having problems. It's also a good idea to know your test results and keep a list of the medicines you take. How do you count fetal kicks? A common method of checking your baby's movement is to note the length of time it takes to count ten movements (such as kicks, flutters, or rolls). Pick your baby's most active time of day to count.  This may be any time from morning to evening. If you don't feel 10 movements in an hour, have something to eat or drink and count for another hour. If you don't feel at least 10 movements in the 2-hour period, call your doctor. When should you call for help? Call your doctor now or seek immediate medical care if:    You noticed that your baby has stopped moving or is moving much less than normal.   Watch closely for changes in your health, and be sure to contact your doctor if you have any problems. Where can you learn more? Go to http://www.gray.com/  Enter [id number] in the search box to learn more about \"Counting Your Baby's Kicks: Care Instructions. \"  Current as of: June 16, 2021               Content Version: 13.2  © 2006-2022 myBarrister. Care instructions adapted under license by [employer] (which disclaims liability or warranty for this information). If you have questions about a medical condition or this instruction, always ask your healthcare professional. [place] [address] any warranty or liability for your use of this information. High Blood Pressure in Pregnancy: Care Instructions  Overview     High blood pressure (hypertension) means that the force of blood against your artery walls is too strong. High blood pressure problems during pregnancy include:  Chronic hypertension. This is high blood pressure that starts before pregnancy. Gestational hypertension. This is high blood pressure that starts in the second or third trimester of pregnancy. Preeclampsia. This is a problem that includes high blood pressure and signs of organ injury during pregnancy. In some cases, it leads to eclampsia. Eclampsia causes seizures. High blood pressure during pregnancy can affect the amount of oxygen and nutrients your baby receives. This can affect how your baby grows.  High blood pressure can also cause other serious problems for both you and your baby, such as placental abruption. To prevent problems, you and your baby will be watched very closely. You will have to check your blood pressure often during pregnancy and possibly after pregnancy. If your blood pressure rises suddenly or is very high during your pregnancy, your doctor may prescribe medicines. They can usually control blood pressure. If your blood pressure affects your or your baby's health, your doctor may need to deliver your baby early. After your baby is born, your blood pressure will probably improve. But sometimes blood pressure problems continue after birth. Follow-up care is a key part of your treatment and safety. Be sure to make and go to all appointments, and call your doctor if you are having problems. It's also a good idea to know your test results and keep a list of the medicines you take. How can you care for yourself at home? Take and write down your blood pressure at home if your doctor says to. Take your medicines exactly as prescribed. Call your doctor if you think you are having a problem with your medicine. Do not smoke. This is one of the best things you can do to help your baby be healthy. If you need help quitting, talk to your doctor about stop-smoking programs and medicines. These can increase your chances of quitting for good. Don't gain too much weight during pregnancy. Talk to your doctor about how much weight gain is healthy. Eat a healthy diet. If your doctor says it's okay, get regular exercise. Walking or swimming several times a week can be healthy for you and your baby. Reduce stress, and find time to relax. This is very important if you continue to work or have a busy schedule. It's also important if you have small children at home. When should you call for help? Share this information with your partner or a friend. They can help you watch for warning signs. Call 911  anytime you think you may need emergency care.  For example, call if:    You passed out (lost consciousness). You have a seizure. Call your doctor now or seek immediate medical care if:    You have symptoms of preeclampsia, such as:  Sudden swelling of your face, hands, or feet. New vision problems (such as dimness, blurring, or seeing spots). A severe headache. Your blood pressure is very high, such as 160/110 or higher. Your blood pressure is higher than your doctor told you it should be, or it rises quickly. You have new nausea or vomiting. You think that you are in labor. You have pain in your belly or pelvis. Watch closely for changes in your health, and be sure to contact your doctor if:    You gain weight rapidly. Where can you learn more? Go to http://francisco j-susanne.info/  Enter A052 in the search box to learn more about \"High Blood Pressure in Pregnancy: Care Instructions. \"  Current as of: June 16, 2021               Content Version: 13.2  © 6291-6244 zoomsquare. Care instructions adapted under license by [employer] (which disclaims liability or warranty for this information). If you have questions about a medical condition or this instruction, always ask your healthcare professional. [place] [address] any warranty or liability for your use of this information.

## 2022-07-27 NOTE — PROGRESS NOTES
2022  12:07 PM    CM met with MARIA T to complete initial assessment and begin discharge planning. MOB verified and confirmed demographics. MARIA T lives with Ken Hardy ( ), along with their 2yr old, at the address on file. MARIA T is employed and plans to take time off from work. YVETTE is a stay at home dad, and will be home with infant. MARIA T reports she has good family support, and feels like she has the support she needs when she returns home. MARIA T plans to breast feed baby and has pump to use at home. [facility], will provide follow up care for infant. MARIA T has car seat, bassinet/crib, clothing, bottles and all necessary supplies for baby. MARIA T has [employer], and will be adding baby to this policy. CM discussed process to add baby to insurance, MOB verbalized understanding. MARIA T denied needing WIC/Medicaid services. MARIA T is , admitted for pre-e work up. Medicare Outpatient Observation Notice (MOON)/ [place] Outpatient Observation Notice (Cecily Garcia) provided to patient/representative with verbal explanation of the notice. Copy provide to pt, pt acknowledge receipt. Care Management Interventions  PCP Verified by CM: Yes Eliecer Hong)  Mode of Transport at Discharge:  Other (see comment)  Transition of Care Consult (CM Consult): Discharge Planning  Support Systems: Spouse/Significant Other, Other Family Member(s)  Confirm Follow Up Transport: Family  Discharge Location  Patient Expects to be Discharged to[de-identified] Home with family assistance  Davide Recinos

## 2022-07-27 NOTE — TELEPHONE ENCOUNTER
Pt calling c/o BPs being 140s/90s even with taking lebatolol 300mg TID. She has been having some dizziness, headache and just feeling hot this morning. Last blood pressure taken this morning was 142/90. Report called to L&D per Dr. Ricki Calhoun for her to come in to rule our pre-eclampsia.

## 2022-07-27 NOTE — H&P
History & Physical    Name: Carol Otto MRN: [id number]  SSN: [id number]    YOB: 1990  Age: 28 y.o. Sex: female      Subjective:     Reason for Admission:  Pregnancy and Chronic Hypertension    History of Present Illness: Carol Otto is a 28 y.o.  female with an estimated gestational age of 44w0d with Estimated Date of Delivery: 22. Patient complains of moderate headache  for 1 day. Pregnancy has been complicated by chronic hypertension. Patient denies right upper quadrant pain  , vaginal bleeding , and visual disturbances. Labetalol dose was just raised recently from 200 mg to 300 mg.    OB History          3    Para   1    Term   1            AB   1    Living   1         SAB   1    IAB        Ectopic        Molar        Multiple        Live Births   1              Past Medical History:   Diagnosis Date    Anemia     Celiac disease     COVID-19 2021    Gestational hypertension     with 1st pregnancy    PIH (pregnancy induced hypertension)     Vitamin D deficiency 02/10/2022    17.5     Past Surgical History:   Procedure Laterality Date    HX ENDOSCOPY       Social History     Occupational History    Occupation: Marketing   Tobacco Use    Smoking status: Never    Smokeless tobacco: Never   Vaping Use    Vaping Use: Never used   Substance and Sexual Activity    Alcohol use: Not Currently    Drug use: Never    Sexual activity: Yes     Partners: Male     Birth control/protection: None     Family History   Problem Relation Age of Onset    Other Mother         Fibroids    Breast Cancer Maternal Grandmother     Ovarian Cancer Maternal Grandmother        Allergies   Allergen Reactions    Gluten Nausea and Vomiting     Prior to Admission medications    Medication Sig Start Date End Date Taking? Authorizing Provider   labetaloL (NORMODYNE) 200 mg tablet Take 1.5 Tablets by mouth every eight (8) hours for 30 days.  22 Yes Gab Alamo MD butalbital-acetaminophen-caffeine (FIORICET, ESGIC) -40 mg per tablet Take 1 Tablet by mouth every six (6) hours as needed for Headache. 7/15/22  Yes Mariluz Chapman MD   cholecalciferol, vitamin D3, (VITAMIN D3 PO) Take  by mouth. Yes Provider, Historical   ferrous sulfate 325 mg (65 mg iron) tablet Take  by mouth daily. Yes Provider, Historical   prenatal vit-calcium-iron-fa (PRENATAL PLUS with CALCIUM) 27 mg iron- 1 mg tab Take 1 Tablet by mouth daily. Yes Provider, Historical   aspirin 81 mg chewable tablet Take 81 mg by mouth daily. Yes Provider, Historical        Review of Systems    Objective:     Vitals:    Vitals:    22 1144 22 1159 22 1214 22 1229   BP: (!) 141/93 136/89 136/79 (!) 148/91   Pulse: 97 100 (!) 107 (!) 102   Resp:       Temp:       SpO2:       Weight:       Height:          Temp (24hrs), Av.1 °F (36.7 °C), Min:98.1 °F (36.7 °C), Max:98.1 °F (36.7 °C)    BP  Min: 123/85  Max: 148/91     Physical Exam    Cervical Exam: not checked  Uterine Activity: None  Membranes: Intact  Fetal Heart Rate: Reactive       Labs:   Recent Results (from the past 24 hour(s))   CBC WITH AUTOMATED DIFF    Collection Time: 22 10:50 AM   Result Value Ref Range    WBC 11.9 (H) 3.6 - 11.0 K/uL    RBC 3.98 3.80 - 5.20 M/uL    HGB 11.5 11.5 - 16.0 g/dL    HCT 33.9 (L) 35.0 - 47.0 %    MCV 85.2 80.0 - 99.0 FL    MCH 28.9 26.0 - 34.0 PG    MCHC 33.9 30.0 - 36.5 g/dL    RDW 13.1 11.5 - 14.5 %    PLATELET 740 766 - 527 K/uL    MPV 9.4 8.9 - 12.9 FL    NRBC 0.0 0  WBC    ABSOLUTE NRBC 0.00 0.00 - 0.01 K/uL    NEUTROPHILS 79 (H) 32 - 75 %    LYMPHOCYTES 14 12 - 49 %    MONOCYTES 5 5 - 13 %    EOSINOPHILS 1 0 - 7 %    BASOPHILS 0 0 - 1 %    IMMATURE GRANULOCYTES 1 (H) 0.0 - 0.5 %    ABS. NEUTROPHILS 9.4 (H) 1.8 - 8.0 K/UL    ABS. LYMPHOCYTES 1.6 0.8 - 3.5 K/UL    ABS. MONOCYTES 0.7 0.0 - 1.0 K/UL    ABS. EOSINOPHILS 0.2 0.0 - 0.4 K/UL    ABS.  BASOPHILS 0.0 0.0 - 0.1 K/UL ABS. IMM. GRANS. 0.1 (H) 0.00 - 0.04 K/UL    DF AUTOMATED     METABOLIC PANEL, COMPREHENSIVE    Collection Time: 07/27/22 10:50 AM   Result Value Ref Range    Sodium 136 136 - 145 mmol/L    Potassium 4.3 3.5 - 5.1 mmol/L    Chloride 108 97 - 108 mmol/L    CO2 20 (L) 21 - 32 mmol/L    Anion gap 8 5 - 15 mmol/L    Glucose 84 65 - 100 mg/dL    BUN 12 6 - 20 MG/DL    Creatinine 0.53 (L) 0.55 - 1.02 MG/DL    BUN/Creatinine ratio 23 (H) 12 - 20      GFR est AA >60 >60 ml/min/1.73m2    GFR est non-AA >60 >60 ml/min/1.73m2    Calcium 9.5 8.5 - 10.1 MG/DL    Bilirubin, total 0.2 0.2 - 1.0 MG/DL    ALT (SGPT) 15 12 - 78 U/L    AST (SGOT) 10 (L) 15 - 37 U/L    Alk. phosphatase 126 (H) 45 - 117 U/L    Protein, total 6.5 6.4 - 8.2 g/dL    Albumin 2.5 (L) 3.5 - 5.0 g/dL    Globulin 4.0 2.0 - 4.0 g/dL    A-G Ratio 0.6 (L) 1.1 - 2.2     PROTEIN/CREATININE RATIO, URINE    Collection Time: 07/27/22 10:50 AM   Result Value Ref Range    Protein, urine random 5 0.0 - 11.9 mg/dL    Creatinine, urine <13.00 mg/dL    Protein/Creat. urine Ratio <0.4        Assessment and Plan:     No sign of PIH. Hypertension under fairly good control. Will manage as outpatient. RTO one week or earlier prn.       Signed By:  Brenden Barajas MD     July 27, 2022

## 2022-07-27 NOTE — PROGRESS NOTES
1030 - Patient here for pre-eclampsia workup. Patient ambulated to room. Resting comfortably in bed.    1245 - Talked to Dr. Jey Serra on phone. Orders to discharge home and to see Dr. Kassie Graham in one week.

## 2022-07-28 ENCOUNTER — TELEPHONE (OUTPATIENT)
Dept: OBGYN CLINIC | Age: 32
End: 2022-07-28

## 2022-07-28 NOTE — TELEPHONE ENCOUNTER
Confirmed name and . Patient  at 36.1 weeks gestation reporting persistent headache since last night with no relief with Tylenol. Patient took 325mg of Tylenol last night. Completed labs for PreE yesterday that she states resulted normal.  Denies visual changes/blurred vision, unilateral edema/facial swelling. Patient stated RUQ pain last night that has resolved this am. /91, patient states this is her baseline x past 2 weeks. Taking BP medication as prescribed. History of migraines, states, \"it doesn't feel like they usually do. \"     Instructed patient to take 1000mg of Tylenol, not to exceed 4000mg in 24 hours and continue rest is quiet dark room with cool compress on head and evaluate for relief. Gave precautions. Patient verbalized understanding.

## 2022-07-28 NOTE — TELEPHONE ENCOUNTER
Noted. Thank you Dr. Seth Rodriguez! Per Dr. Seth Rodriguez: \"Sounds good and I am seeing her tomorrow. She also has .  We are going to have to induce her soon. Trying to hold out until baby is in a safer range. \"     Called and spoke to patient. Confirmed name and . Informed of the above. Patient verbalized understanding and stated feeling better after increased Tylenol dose.

## 2022-07-29 ENCOUNTER — TELEPHONE (OUTPATIENT)
Dept: OBGYN CLINIC | Age: 32
End: 2022-07-29

## 2022-07-29 ENCOUNTER — ROUTINE PRENATAL (OUTPATIENT)
Dept: OBGYN CLINIC | Age: 32
End: 2022-07-29
Payer: COMMERCIAL

## 2022-07-29 ENCOUNTER — HOSPITAL ENCOUNTER (OUTPATIENT)
Dept: PERINATAL CARE | Age: 32
Discharge: HOME | End: 2022-07-29
Attending: OBSTETRICS & GYNECOLOGY
Payer: COMMERCIAL

## 2022-07-29 VITALS — SYSTOLIC BLOOD PRESSURE: 151 MMHG | BODY MASS INDEX: 30.7 KG/M2 | WEIGHT: 196 LBS | DIASTOLIC BLOOD PRESSURE: 103 MMHG

## 2022-07-29 DIAGNOSIS — O13.3 GESTATIONAL HYPERTENSION, THIRD TRIMESTER: ICD-10-CM

## 2022-07-29 DIAGNOSIS — Z3A.36 36 WEEKS GESTATION OF PREGNANCY: Primary | ICD-10-CM

## 2022-07-29 PROCEDURE — 0502F SUBSEQUENT PRENATAL CARE: CPT | Performed by: OBSTETRICS & GYNECOLOGY

## 2022-07-29 PROCEDURE — 76816 OB US FOLLOW-UP PER FETUS: CPT | Performed by: OBSTETRICS & GYNECOLOGY

## 2022-07-29 PROCEDURE — 59426 ANTEPARTUM CARE ONLY: CPT | Performed by: OBSTETRICS & GYNECOLOGY

## 2022-07-29 PROCEDURE — 76819 FETAL BIOPHYS PROFIL W/O NST: CPT | Performed by: OBSTETRICS & GYNECOLOGY

## 2022-07-29 NOTE — PROGRESS NOTES
Doing well. Bps 140's/90's - 100 at home. No HA/Visual changes. Was seen at [facility] today US wnl and BPP 8/8. /100 132/91  induction advised  Posted for 8/1 630am  Bedrest this weekend continue current labetolol. Problems  Dating by LMP=1st Trimester US  Vitamin D Def  History of Gestation HTN G1 taking Baby ASA  Boy -- Tommas Jest  US 19w6d = 20w4d 85% poor views of RVOT and Foot ? SUA rescan 28w -- Has appt 5/31  US 27w6d = 27w5d 43% Hadlock. RVOT seen SUA -- Rescan growth 3 wk PNC  US 32w0d = 32w6d 33% hadlock.    US 36w2d = 36w2d 51% hadlock 6'6\"

## 2022-07-29 NOTE — TELEPHONE ENCOUNTER
2 identifiers confirmed. Patient  at 36.2 weeks gestation, seen in clinic today had cervical exam, reporting leakage of clear fluids x1 into her underwear, does not smell like urine. Denies large gush. Has mild irregular contractions. Denies vaginal bleeding. Reports +fetal movement. Instructed patient to do pad trial, place dry pad and lie down comfortable for at least 1 hour and assess pad upon rising afterwards. If pad wet/moist to be evaluated at L&D. Patient verbalized understanding.

## 2022-08-01 ENCOUNTER — ANESTHESIA (OUTPATIENT)
Dept: LABOR AND DELIVERY | Age: 32
End: 2022-08-01
Payer: COMMERCIAL

## 2022-08-01 ENCOUNTER — ANESTHESIA EVENT (OUTPATIENT)
Dept: LABOR AND DELIVERY | Age: 32
End: 2022-08-01
Payer: COMMERCIAL

## 2022-08-01 ENCOUNTER — HOSPITAL ENCOUNTER (INPATIENT)
Age: 32
LOS: 2 days | Discharge: HOME | End: 2022-08-03
Attending: OBSTETRICS & GYNECOLOGY | Admitting: OBSTETRICS & GYNECOLOGY
Payer: COMMERCIAL

## 2022-08-01 PROBLEM — Z34.90 PREGNANCY: Status: RESOLVED | Noted: 2022-07-12 | Resolved: 2022-08-01

## 2022-08-01 PROBLEM — Z3A.36 36 WEEKS GESTATION OF PREGNANCY: Status: ACTIVE | Noted: 2022-08-01

## 2022-08-01 LAB
ABO + RH BLD: NORMAL
ALBUMIN SERPL-MCNC: 2.4 G/DL (ref 3.5–5)
ALBUMIN/GLOB SERPL: 0.6 (ref 1.1–2.2)
ALP SERPL-CCNC: 128 U/L (ref 45–117)
ALT SERPL-CCNC: 14 U/L (ref 12–78)
ANION GAP SERPL CALC-SCNC: 9 MMOL/L (ref 5–15)
AST SERPL-CCNC: 12 U/L (ref 15–37)
BILIRUB SERPL-MCNC: 0.1 MG/DL (ref 0.2–1)
BLOOD GROUP ANTIBODIES SERPL: NORMAL
BUN SERPL-MCNC: 9 MG/DL (ref 6–20)
BUN/CREAT SERPL: 15 (ref 12–20)
CALCIUM SERPL-MCNC: 8.9 MG/DL (ref 8.5–10.1)
CHLORIDE SERPL-SCNC: 111 MMOL/L (ref 97–108)
CO2 SERPL-SCNC: 19 MMOL/L (ref 21–32)
CREAT SERPL-MCNC: 0.6 MG/DL (ref 0.55–1.02)
ERYTHROCYTE [DISTWIDTH] IN BLOOD BY AUTOMATED COUNT: 13.1 % (ref 11.5–14.5)
GLOBULIN SER CALC-MCNC: 3.7 G/DL (ref 2–4)
GLUCOSE SERPL-MCNC: 83 MG/DL (ref 65–100)
HCT VFR BLD AUTO: 32.5 % (ref 35–47)
HGB BLD-MCNC: 11.1 G/DL (ref 11.5–16)
MCH RBC QN AUTO: 29.1 PG (ref 26–34)
MCHC RBC AUTO-ENTMCNC: 34.2 G/DL (ref 30–36.5)
MCV RBC AUTO: 85.1 FL (ref 80–99)
NRBC # BLD: 0 K/UL (ref 0–0.01)
NRBC BLD-RTO: 0 PER 100 WBC
PLATELET # BLD AUTO: 283 K/UL (ref 150–400)
PMV BLD AUTO: 9.4 FL (ref 8.9–12.9)
POTASSIUM SERPL-SCNC: 3.9 MMOL/L (ref 3.5–5.1)
PROT SERPL-MCNC: 6.1 G/DL (ref 6.4–8.2)
RBC # BLD AUTO: 3.82 M/UL (ref 3.8–5.2)
SODIUM SERPL-SCNC: 139 MMOL/L (ref 136–145)
SPECIMEN EXP DATE BLD: NORMAL
WBC # BLD AUTO: 9 K/UL (ref 3.6–11)

## 2022-08-01 PROCEDURE — 4A1H74Z MONITORING OF PRODUCTS OF CONCEPTION, CARDIAC ELECTRICAL ACTIVITY, VIA NATURAL OR ARTIFICIAL OPENING: ICD-10-PCS | Performed by: OBSTETRICS & GYNECOLOGY

## 2022-08-01 PROCEDURE — 0KQM0ZZ REPAIR PERINEUM MUSCLE, OPEN APPROACH: ICD-10-PCS | Performed by: OBSTETRICS & GYNECOLOGY

## 2022-08-01 PROCEDURE — 00HU33Z INSERTION OF INFUSION DEVICE INTO SPINAL CANAL, PERCUTANEOUS APPROACH: ICD-10-PCS | Performed by: OBSTETRICS & GYNECOLOGY

## 2022-08-01 PROCEDURE — 77030019905 HC CATH URETH INTMIT MDII -A

## 2022-08-01 PROCEDURE — 75410000000 HC DELIVERY VAGINAL/SINGLE: Performed by: OBSTETRICS & GYNECOLOGY

## 2022-08-01 PROCEDURE — 75410000002 HC LABOR FEE PER 1 HR: Performed by: OBSTETRICS & GYNECOLOGY

## 2022-08-01 PROCEDURE — 76060000078 HC EPIDURAL ANESTHESIA: Performed by: NURSE ANESTHETIST, CERTIFIED REGISTERED

## 2022-08-01 PROCEDURE — 10H07YZ INSERTION OF OTHER DEVICE INTO PRODUCTS OF CONCEPTION, VIA NATURAL OR ARTIFICIAL OPENING: ICD-10-PCS | Performed by: OBSTETRICS & GYNECOLOGY

## 2022-08-01 PROCEDURE — 2709999900 HC NON-CHARGEABLE SUPPLY

## 2022-08-01 PROCEDURE — 74011250637 HC RX REV CODE- 250/637: Performed by: OBSTETRICS & GYNECOLOGY

## 2022-08-01 PROCEDURE — 3E033VJ INTRODUCTION OF OTHER HORMONE INTO PERIPHERAL VEIN, PERCUTANEOUS APPROACH: ICD-10-PCS | Performed by: OBSTETRICS & GYNECOLOGY

## 2022-08-01 PROCEDURE — 65410000002 HC RM PRIVATE OB

## 2022-08-01 PROCEDURE — 85027 COMPLETE CBC AUTOMATED: CPT

## 2022-08-01 PROCEDURE — 74011000250 HC RX REV CODE- 250: Performed by: NURSE ANESTHETIST, CERTIFIED REGISTERED

## 2022-08-01 PROCEDURE — 10907ZC DRAINAGE OF AMNIOTIC FLUID, THERAPEUTIC FROM PRODUCTS OF CONCEPTION, VIA NATURAL OR ARTIFICIAL OPENING: ICD-10-PCS | Performed by: OBSTETRICS & GYNECOLOGY

## 2022-08-01 PROCEDURE — 80053 COMPREHEN METABOLIC PANEL: CPT

## 2022-08-01 PROCEDURE — 59410 OBSTETRICAL CARE: CPT | Performed by: OBSTETRICS & GYNECOLOGY

## 2022-08-01 PROCEDURE — 77030014125 HC TY EPDRL BBMI -B: Performed by: ANESTHESIOLOGY

## 2022-08-01 PROCEDURE — 65270000029 HC RM PRIVATE

## 2022-08-01 PROCEDURE — 86900 BLOOD TYPING SEROLOGIC ABO: CPT

## 2022-08-01 PROCEDURE — 74011250636 HC RX REV CODE- 250/636: Performed by: OBSTETRICS & GYNECOLOGY

## 2022-08-01 PROCEDURE — 36415 COLL VENOUS BLD VENIPUNCTURE: CPT

## 2022-08-01 PROCEDURE — 74011000250 HC RX REV CODE- 250: Performed by: ANESTHESIOLOGY

## 2022-08-01 PROCEDURE — 77030005513 HC CATH URETH FOL11 MDII -B

## 2022-08-01 PROCEDURE — 75410000003 HC RECOV DEL/VAG/CSECN EA 0.5 HR: Performed by: OBSTETRICS & GYNECOLOGY

## 2022-08-01 PROCEDURE — 10H073Z INSERTION OF MONITORING ELECTRODE INTO PRODUCTS OF CONCEPTION, VIA NATURAL OR ARTIFICIAL OPENING: ICD-10-PCS | Performed by: OBSTETRICS & GYNECOLOGY

## 2022-08-01 RX ORDER — ZOLPIDEM TARTRATE 5 MG/1
5 TABLET ORAL
Status: DISCONTINUED | OUTPATIENT
Start: 2022-08-01 | End: 2022-08-03 | Stop reason: HOSPADM

## 2022-08-01 RX ORDER — OXYTOCIN/RINGER'S LACTATE 30/500 ML
0-20 PLASTIC BAG, INJECTION (ML) INTRAVENOUS
Status: DISCONTINUED | OUTPATIENT
Start: 2022-08-01 | End: 2022-08-01

## 2022-08-01 RX ORDER — NALOXONE HYDROCHLORIDE 0.4 MG/ML
0.4 INJECTION, SOLUTION INTRAMUSCULAR; INTRAVENOUS; SUBCUTANEOUS AS NEEDED
Status: DISCONTINUED | OUTPATIENT
Start: 2022-08-01 | End: 2022-08-03 | Stop reason: HOSPADM

## 2022-08-01 RX ORDER — HYDROCODONE BITARTRATE AND ACETAMINOPHEN 7.5; 325 MG/1; MG/1
1 TABLET ORAL
Status: DISCONTINUED | OUTPATIENT
Start: 2022-08-01 | End: 2022-08-03 | Stop reason: HOSPADM

## 2022-08-01 RX ORDER — BUPIVACAINE HYDROCHLORIDE 2.5 MG/ML
INJECTION, SOLUTION EPIDURAL; INFILTRATION; INTRACAUDAL AS NEEDED
Status: DISCONTINUED | OUTPATIENT
Start: 2022-08-01 | End: 2022-08-01 | Stop reason: HOSPADM

## 2022-08-01 RX ORDER — LABETALOL 200 MG/1
400 TABLET, FILM COATED ORAL 3 TIMES DAILY
Status: DISCONTINUED | OUTPATIENT
Start: 2022-08-01 | End: 2022-08-03 | Stop reason: HOSPADM

## 2022-08-01 RX ORDER — SODIUM CHLORIDE 0.9 % (FLUSH) 0.9 %
5-40 SYRINGE (ML) INJECTION AS NEEDED
Status: DISCONTINUED | OUTPATIENT
Start: 2022-08-01 | End: 2022-08-03 | Stop reason: HOSPADM

## 2022-08-01 RX ORDER — FOLIC ACID/MULTIVIT,IRON,MINER 0.4MG-18MG
1 TABLET ORAL DAILY
Status: DISCONTINUED | OUTPATIENT
Start: 2022-08-02 | End: 2022-08-03 | Stop reason: HOSPADM

## 2022-08-01 RX ORDER — ONDANSETRON 2 MG/ML
4 INJECTION INTRAMUSCULAR; INTRAVENOUS
Status: DISCONTINUED | OUTPATIENT
Start: 2022-08-01 | End: 2022-08-02

## 2022-08-01 RX ORDER — OXYTOCIN/RINGER'S LACTATE 30/500 ML
10 PLASTIC BAG, INJECTION (ML) INTRAVENOUS AS NEEDED
Status: DISCONTINUED | OUTPATIENT
Start: 2022-08-01 | End: 2022-08-03 | Stop reason: HOSPADM

## 2022-08-01 RX ORDER — OXYTOCIN/RINGER'S LACTATE 30/500 ML
10 PLASTIC BAG, INJECTION (ML) INTRAVENOUS AS NEEDED
Status: DISCONTINUED | OUTPATIENT
Start: 2022-08-01 | End: 2022-08-02

## 2022-08-01 RX ORDER — OXYTOCIN/RINGER'S LACTATE 30/500 ML
500 PLASTIC BAG, INJECTION (ML) INTRAVENOUS CONTINUOUS
Status: DISCONTINUED | OUTPATIENT
Start: 2022-08-01 | End: 2022-08-03 | Stop reason: HOSPADM

## 2022-08-01 RX ORDER — FENTANYL/BUPIVACAINE/NS/PF 2-1250MCG
1-16 SYRINGE (ML) EPIDURAL CONTINUOUS
Status: DISCONTINUED | OUTPATIENT
Start: 2022-08-01 | End: 2022-08-01

## 2022-08-01 RX ORDER — HYDROCORTISONE ACETATE PRAMOXINE HCL 2.5; 1 G/100G; G/100G
CREAM TOPICAL AS NEEDED
Status: DISCONTINUED | OUTPATIENT
Start: 2022-08-01 | End: 2022-08-01 | Stop reason: RX

## 2022-08-01 RX ORDER — SODIUM CHLORIDE, SODIUM LACTATE, POTASSIUM CHLORIDE, CALCIUM CHLORIDE 600; 310; 30; 20 MG/100ML; MG/100ML; MG/100ML; MG/100ML
125 INJECTION, SOLUTION INTRAVENOUS CONTINUOUS
Status: DISCONTINUED | OUTPATIENT
Start: 2022-08-01 | End: 2022-08-01

## 2022-08-01 RX ORDER — OXYTOCIN/RINGER'S LACTATE 30/500 ML
87.3 PLASTIC BAG, INJECTION (ML) INTRAVENOUS AS NEEDED
Status: DISCONTINUED | OUTPATIENT
Start: 2022-08-01 | End: 2022-08-02

## 2022-08-01 RX ORDER — DIPHENHYDRAMINE HCL 25 MG
25 CAPSULE ORAL
Status: DISCONTINUED | OUTPATIENT
Start: 2022-08-01 | End: 2022-08-03 | Stop reason: HOSPADM

## 2022-08-01 RX ORDER — BISACODYL 5 MG
5 TABLET, DELAYED RELEASE (ENTERIC COATED) ORAL DAILY PRN
Status: DISCONTINUED | OUTPATIENT
Start: 2022-08-01 | End: 2022-08-03 | Stop reason: HOSPADM

## 2022-08-01 RX ORDER — IBUPROFEN 800 MG/1
800 TABLET ORAL EVERY 8 HOURS
Status: DISCONTINUED | OUTPATIENT
Start: 2022-08-01 | End: 2022-08-03 | Stop reason: HOSPADM

## 2022-08-01 RX ORDER — OXYTOCIN/RINGER'S LACTATE 30/500 ML
87.3 PLASTIC BAG, INJECTION (ML) INTRAVENOUS AS NEEDED
Status: DISCONTINUED | OUTPATIENT
Start: 2022-08-01 | End: 2022-08-03 | Stop reason: HOSPADM

## 2022-08-01 RX ORDER — ACETAMINOPHEN 325 MG/1
650 TABLET ORAL
Status: DISCONTINUED | OUTPATIENT
Start: 2022-08-01 | End: 2022-08-03 | Stop reason: HOSPADM

## 2022-08-01 RX ORDER — LIDOCAINE HYDROCHLORIDE AND EPINEPHRINE 15; 5 MG/ML; UG/ML
INJECTION, SOLUTION EPIDURAL
Status: SHIPPED | OUTPATIENT
Start: 2022-08-01 | End: 2022-08-01

## 2022-08-01 RX ORDER — PROMETHAZINE HYDROCHLORIDE 25 MG/1
25 TABLET ORAL
Status: DISCONTINUED | OUTPATIENT
Start: 2022-08-01 | End: 2022-08-02

## 2022-08-01 RX ORDER — NALOXONE HYDROCHLORIDE 0.4 MG/ML
0.4 INJECTION, SOLUTION INTRAMUSCULAR; INTRAVENOUS; SUBCUTANEOUS AS NEEDED
Status: DISCONTINUED | OUTPATIENT
Start: 2022-08-01 | End: 2022-08-02

## 2022-08-01 RX ADMIN — BUPIVACAINE HYDROCHLORIDE 4 ML: 2.5 INJECTION, SOLUTION EPIDURAL; INFILTRATION; INTRACAUDAL; PERINEURAL at 09:39

## 2022-08-01 RX ADMIN — LIDOCAINE HYDROCHLORIDE,EPINEPHRINE BITARTRATE 5 ML: 15; .005 INJECTION, SOLUTION EPIDURAL; INFILTRATION; INTRACAUDAL; PERINEURAL at 13:35

## 2022-08-01 RX ADMIN — LABETALOL HYDROCHLORIDE 400 MG: 200 TABLET, FILM COATED ORAL at 15:30

## 2022-08-01 RX ADMIN — LABETALOL HYDROCHLORIDE 400 MG: 200 TABLET, FILM COATED ORAL at 22:43

## 2022-08-01 RX ADMIN — SODIUM CHLORIDE, POTASSIUM CHLORIDE, SODIUM LACTATE AND CALCIUM CHLORIDE 999 ML/HR: 600; 310; 30; 20 INJECTION, SOLUTION INTRAVENOUS at 09:31

## 2022-08-01 RX ADMIN — BUPIVACAINE HYDROCHLORIDE 4 ML: 2.5 INJECTION, SOLUTION EPIDURAL; INFILTRATION; INTRACAUDAL; PERINEURAL at 09:38

## 2022-08-01 RX ADMIN — LIDOCAINE HYDROCHLORIDE,EPINEPHRINE BITARTRATE 3 ML: 15; .005 INJECTION, SOLUTION EPIDURAL; INFILTRATION; INTRACAUDAL; PERINEURAL at 09:37

## 2022-08-01 RX ADMIN — OXYTOCIN 2 MILLI-UNITS/MIN: 10 INJECTION, SOLUTION INTRAMUSCULAR; INTRAVENOUS at 07:32

## 2022-08-01 RX ADMIN — SODIUM CHLORIDE, POTASSIUM CHLORIDE, SODIUM LACTATE AND CALCIUM CHLORIDE 125 ML/HR: 600; 310; 30; 20 INJECTION, SOLUTION INTRAVENOUS at 07:31

## 2022-08-01 RX ADMIN — LABETALOL HYDROCHLORIDE 400 MG: 200 TABLET, FILM COATED ORAL at 09:13

## 2022-08-01 RX ADMIN — Medication 10 ML/HR: at 09:55

## 2022-08-01 RX ADMIN — IBUPROFEN 800 MG: 800 TABLET, FILM COATED ORAL at 22:43

## 2022-08-01 RX ADMIN — OXYTOCIN 999 ML/HR: 10 INJECTION, SOLUTION INTRAMUSCULAR; INTRAVENOUS at 13:49

## 2022-08-01 RX ADMIN — IBUPROFEN 800 MG: 800 TABLET, FILM COATED ORAL at 14:27

## 2022-08-01 NOTE — L&D DELIVERY NOTE
Delivery Summary    Patient: Luis M Mitchell MRN: [id number]  SSN: [id number]    YOB: 1990  Age: 28 y.o. Sex: female       Information for the patient's :  Parris Vazquez, Male Erica Landa [[id number]]     Labor Events:    Labor: Yes    Steroids: Full Course   Cervical Ripening Date/Time:       Cervical Ripening Type: None   Antibiotics During Labor:     Rupture Identifier:      Rupture Date/Time: 2022 8:39 AM   Rupture Type: AROM   Amniotic Fluid Volume: Moderate    Amniotic Fluid Description: Clear    Amniotic Fluid Odor:      Induction: Oxytocin;AROM       Induction Date/Time: 2022 7:32 AM    Indications for Induction: Gestational Hypertension    Augmentation: None   Augmentation Date/Time:      Indications for Augmentation:     Labor complications: None       Additional complications:        Delivery Events:  Indications For Episiotomy:     Episiotomy:     Perineal Laceration(s):     Repaired:     Periurethral Laceration Location:      Repaired:     Labial Laceration Location:     Repaired:     Sulcal Laceration Location:     Repaired:     Vaginal Laceration Location:     Repaired:     Cervical Laceration Location:     Repaired:     Repair Suture: Vicryl 3-0   Number of Repair Packets:     Estimated Blood Loss (ml):  ml   Quantitative Blood Loss (ml)                Delivery Date: 2022    Delivery Time: 1:42 PM  Delivery Type: Vaginal, Spontaneous  Sex:  Male    Gestational Age: 44w9d   Delivery Clinician:  Shaista Anderson Ii  Living Status: Living   Delivery Location: L&D            APGARS  One minute Five minutes Ten minutes   Skin color:            Heart rate:            Grimace:            Muscle tone:            Breathing:             Totals:                Presentation: Vertex    Position: Left Occiput Anterior  Resuscitation Method:  Tactile Stimulation;Suctioning-bulb     Meconium Stained: None      Cord Information:    Complications: None  Cord around:    Delayed cord clamping? Yes  Cord clamped date/time:2022  1:44 PM  Disposition of Cord Blood: Lab    Blood Gases Sent?: No    Placenta:  Date/Time: 2022  1:49 PM  Removal: Expressed      Appearance: Normal      Measurements:  Birth Weight:        Birth Length:        Head Circumference:        Chest Circumference:       Abdominal Girth: Other Providers:   LISHA Arriola;MIRIAM DAS;Js FLOYD EMILY;ELIZABETH MEDINA, Obstetrician;Primary Nurse;Primary [place] Nurse;Staff Nurse;Staff Nurse         Group B Strep: No results found for: GRBSEXT, GRBSEXT  Information for the patient's :  Maria Teresa Helm, Male Bear Youssef [[id number]]   No results found for: ABORH, PCTABR, PCTDIG, BILI, ABORHEXT, ABORH   No results for input(s): PCO2CB, PO2CB, HCO3I, SO2I, IBD, PTEMPI, SPECTI, PHICB, ISITE, IDEV, IALLEN in the last 72 hours. Pt pushed x10 min & delivered term male infant with apgars 9,9. 2nd degree vaginal/perineal laceration repaired with 3-0 vicryl.  cc. No complications.

## 2022-08-01 NOTE — H&P
History & Physical    Name: Arsenio Roblero MRN: [id number]  SSN: [id number]    YOB: 1990  Age: 28 y.o. Sex: female        Subjective:   Ms. Nirali Gongora is a  admitted with an Estimated Date of Delivery: 22 currently at 36w5d for induction of labor. Prenatal course complicated by:  M4C4M1 Due 22 Problems (from 22 to present)       Problem Noted Resolved    36 weeks gestation of pregnancy 2022 by Adelina Yoon MD No    Gestational hypertension, third trimester 2022 by Adelina Yoon MD No    Single umbilical artery  by Adelina Yoon MD No    Vitamin D deficiency 2/10/2022 by Adelina Yoon MD No    Overview Addendum 2022  9:43 AM by Adelina Yoon MD     17.5 5000iu  22 24.5               was complicated by  gestational hypertension with recently increasing blood pressures. MFM advised delivery . Please see prenatal records for details. Patient Active Problem List   Diagnosis Code    Vitamin D deficiency D25.8    Single umbilical artery B79.3    Gestational hypertension, third trimester O13.3    36 weeks gestation of pregnancy Z3A.36     Past Medical History:   Diagnosis Date    Anemia     Celiac disease     COVID-19 2021    Gestational hypertension     with 1st pregnancy    PIH (pregnancy induced hypertension)     Vitamin D deficiency 02/10/2022    17.5     Past Surgical History:   Procedure Laterality Date    HX ENDOSCOPY       OB History    Para Term  AB Living   3 1 1 0 1 1   SAB IAB Ectopic Molar Multiple Live Births   1 0 0 0 0 1      # Outcome Date GA Lbr Keny/2nd Weight Sex Delivery Anes PTL Lv   3 Current            2 Term 20 37w3d  6 lb 12.5 oz (3.075 kg) M Vag-Spont EPI  EJ      Complications: Gestational hypertension      Name: Be   1 SAB              Gyn Flowsheet:  No flowsheet data found.   Social History     Socioeconomic History    Marital status:    Occupational History Occupation: Marketing   Tobacco Use    Smoking status: Never    Smokeless tobacco: Never   Vaping Use    Vaping Use: Never used   Substance and Sexual Activity    Alcohol use: Not Currently    Drug use: Never    Sexual activity: Yes     Partners: Male     Birth control/protection: None      Family History   Problem Relation Age of Onset    Other Mother         Fibroids    Breast Cancer Maternal Grandmother     Ovarian Cancer Maternal Grandmother      No current facility-administered medications on file prior to encounter. Current Outpatient Medications on File Prior to Encounter   Medication Sig Dispense Refill    labetaloL (NORMODYNE) 200 mg tablet Take 1.5 Tablets by mouth every eight (8) hours for 30 days. 60 Tablet 3    butalbital-acetaminophen-caffeine (FIORICET, ESGIC) -40 mg per tablet Take 1 Tablet by mouth every six (6) hours as needed for Headache. 30 Tablet 0    cholecalciferol, vitamin D3, (VITAMIN D3 PO) Take  by mouth. ferrous sulfate 325 mg (65 mg iron) tablet Take  by mouth daily. prenatal vit-calcium-iron-fa (PRENATAL PLUS with CALCIUM) 27 mg iron- 1 mg tab Take 1 Tablet by mouth daily. aspirin 81 mg chewable tablet Take 81 mg by mouth daily.        Allergies   Allergen Reactions    Gluten Nausea and Vomiting       Review of Systems - History obtained from the patient-negative for:  Constitutional: weight loss, fever, night sweats  HEENT: hearing loss, earache, congestion, snoring, sorethroat  CV: chest pain, palpitations, edema  Resp: cough, shortness of breath, wheezing  GI: change in bowel habits, abdominal pain, black or bloody stools  : frequency, dysuria, hematuria, vaginal discharge  MSK: back pain, joint pain, muscle pain  Skin: itching, rash, hives  Neuro: dizziness, headache, confusion, weakness  Psych: anxiety, depression, change in mood  Heme/lymph: bleeding, bruising, pallor    Objective:   Vitals:  Vitals:    08/01/22 1045 08/01/22 9724 08/01/22 0746 08/01/22 0749   BP:  (!) 148/96  (!) 149/99   Pulse:  98  90   Resp:  16  16   Temp:  98.2 °F (36.8 °C)  98.5 °F (36.9 °C)   SpO2:  99% 99% 99%   Weight: 196 lb (88.9 kg)      Height: 5' 7\" (1.702 m)           Physical Exam:  Patient without distress. Heart: Regular rate and rhythm  Lung: normal respiratory effort  Abdomen: soft, nontender  Fundus: soft and non tender  Cervical Exam:  / / /   Lower Extremities:  - Edema No  Membranes:  Artificial Rupture of Membranes; Amniotic Fluid: medium amount of clear fluid  Fetal Heart Rate: Reactive  Baseline: 140 per minute  Variability: moderate  Accelerations: yes  Decelerations: variable  Uterine contractions: regular, every 4 minutes    Prenatal Labs:   Lab Results   Component Value Date/Time    Rubella, External Immune 02/10/2022 12:00 AM    HBsAg, External Negative 02/10/2022 12:00 AM    HIV, External Negative 02/10/2022 12:00 AM    RPR, External Non Reactive 02/10/2022 12:00 AM    Gonorrhea, External Negative 02/10/2022 12:00 AM    Chlamydia, External  Negative 02/10/2022 12:00 AM       OB Plans:  Pregnancy: [place]  Sex: Male     Delivery Plans  Planned delivery method: Vaginal  Planned delivery location: [address] [place] [address]  Planned anesthesia: Epidural  Acceptable blood products: All     Post-Delivery Plans  Feeding intentions: Breast Milk  Circumcision requested: Provider Performed   . ob    Assessment/Plan:     Plan: Admit for Reassuring fetal status, Continue plan for vaginal delivery, increase labetolol to 400 TID . Group B Strep was negative.     Signed By:  Bridget Carr MD     August 1, 2022 No

## 2022-08-01 NOTE — PROGRESS NOTES
0710: Bedside and Verbal shift change report given to CONY Doyle RN (oncoming nurse) by LINDA Vargas RN (offgoing nurse). Report included the following information SBAR, Kardex, Procedure Summary, Intake/Output, MAR, Recent Results, Med Rec Status, and Quality Measures. 0830: AROM    0925: DREW Javed CRNA at bedside for epidural    4505: time out completed for epidural with Deepika Oropezae CRNA.     2765: test dose, hr:88.     1127: SVE per this RN with pt consent after pt c/o increased vaginal pressure and recurrent variables on fetal tracing. 1130: Pt turned back to lateral R side, with LLE in stirrup, in t-michael. Pt tolerated well. 1139: Dr. Davon Downey sent perfect serve regarding pt SVE and recurrent variables. 1217: SVE per Dr. Davon Downey- pt 6-7/-2. IUPC and FSE placed at this time by MD.     1331: SVE per this RN- pt 10/100/+2. Brittany Beatty RN updating Dr. Davon Downey. Nursery made aware. 1342: SVE of viable male  by Dr. Davon Downey. RN remained at bedside throughout pushing. EFM continuously assessed. Vaginal delivery of viable infant. 1450: Straight cath by this RN d/t fundus deviated to Right. 150ml urine measured. 1845: Pt up to void for second time- 500ml output measured. 1920: Bedside and Verbal shift change report given to AWILDA Merritt RN (oncoming nurse) by Seth Hogan RN (offgoing nurse). Report included the following information SBAR, Kardex, Procedure Summary, Intake/Output, MAR, Recent Results, Med Rec Status, and Quality Measures.

## 2022-08-01 NOTE — PROGRESS NOTES
0630 - Pt arrived to unit for scheduled induction at this time. Pt oriented to room, given call bell, and instructed to change into pt gown. 0710 - Bedside and Verbal shift change report given to CONY Aguero RN (oncoming nurse) by LINDA Vargas RN (offgoing nurse). Report included the following information SBAR, Kardex, Procedure Summary, Intake/Output, MAR, Recent Results, Med Rec Status, and Quality Measures.

## 2022-08-01 NOTE — ANESTHESIA PROCEDURE NOTES
Epidural Block    Patient location during procedure: OB  Start time: 8/1/2022 9:28 AM  End time: 8/1/2022 9:39 AM  Reason for block: labor epidural  Staffing  Performed: CRNA   Anesthesiologist: Gabrielle Taylor MD  Resident/CRNA: Fatmata Lewis CRNA  Preanesthetic Checklist  Completed: patient identified, IV checked, site marked, risks and benefits discussed, surgical consent, monitors and equipment checked, pre-op evaluation, timeout performed and fire risk safety assessment completed and verbalized  Block Placement  Patient position: sitting  Prep: ChloraPrep  Sterility prep: cap, drape, mask and gloves  Sedation level: no sedation  Patient monitoring: heart rate, frequent blood pressure checks and continuous pulse oximetry  Approach: midline  Location: lumbar  Lumbar location: L3-L4  Epidural  Loss of resistance technique: air  Guidance: landmark technique  Needle  Needle type: Tuohy   Needle gauge: 17 G  Catheter size: 20 G  Catheter at skin depth: 9 cm  Catheter securement method: clear occlusive dressing and surgical tape  Test dose: negative  Medications Administered  lidocaine-EPINEPHrine (XYLOCAINE) 1.5 %-1:200,000 Epidural - Epidural   3 mL - 8/1/2022 9:37:00 AM  Assessment  Block outcome: pain improved  Number of attempts: 2  Procedure assessment: patient tolerated procedure well with no immediate complications

## 2022-08-02 LAB
ERYTHROCYTE [DISTWIDTH] IN BLOOD BY AUTOMATED COUNT: 13.1 % (ref 11.5–14.5)
HCT VFR BLD AUTO: 28.2 % (ref 35–47)
HGB BLD-MCNC: 9.4 G/DL (ref 11.5–16)
MCH RBC QN AUTO: 29.1 PG (ref 26–34)
MCHC RBC AUTO-ENTMCNC: 33.3 G/DL (ref 30–36.5)
MCV RBC AUTO: 87.3 FL (ref 80–99)
NRBC # BLD: 0 K/UL (ref 0–0.01)
NRBC BLD-RTO: 0 PER 100 WBC
PLATELET # BLD AUTO: 232 K/UL (ref 150–400)
PMV BLD AUTO: 9.5 FL (ref 8.9–12.9)
RBC # BLD AUTO: 3.23 M/UL (ref 3.8–5.2)
WBC # BLD AUTO: 10.1 K/UL (ref 3.6–11)

## 2022-08-02 PROCEDURE — 65270000029 HC RM PRIVATE

## 2022-08-02 PROCEDURE — 85027 COMPLETE CBC AUTOMATED: CPT

## 2022-08-02 PROCEDURE — 36415 COLL VENOUS BLD VENIPUNCTURE: CPT

## 2022-08-02 PROCEDURE — 74011250637 HC RX REV CODE- 250/637: Performed by: OBSTETRICS & GYNECOLOGY

## 2022-08-02 RX ADMIN — MUPIROCIN: 20 OINTMENT TOPICAL at 07:51

## 2022-08-02 RX ADMIN — ACETAMINOPHEN 650 MG: 325 TABLET ORAL at 03:30

## 2022-08-02 RX ADMIN — IBUPROFEN 800 MG: 800 TABLET, FILM COATED ORAL at 07:50

## 2022-08-02 RX ADMIN — IBUPROFEN 800 MG: 800 TABLET, FILM COATED ORAL at 23:11

## 2022-08-02 RX ADMIN — IBUPROFEN 800 MG: 800 TABLET, FILM COATED ORAL at 15:09

## 2022-08-02 RX ADMIN — Medication 1 TABLET: at 07:50

## 2022-08-02 RX ADMIN — ACETAMINOPHEN 650 MG: 325 TABLET ORAL at 20:23

## 2022-08-02 NOTE — ROUTINE PROCESS
Bedside and verbal shift change report given to [facility],[address], oncoming nurse, as assigned, by [facility] nurse, Ju Valle RN. Report included SBAR, Kardex, I&Os, Recent Results, Procedures, MAR, and changes in patient status. Oncoming nurse and patient given opportunity for questions.

## 2022-08-02 NOTE — PROGRESS NOTES
Post-Partum Day Number 1    Progress note post vaginal delivery    Pt has no unusual postpartum complaints. Pain is well controlled with current medications. The baby is doing well. Urinary output is adequate. Voiding without difficulty. The patient is ambulating well. Tolerating a regular diet. Vitals:  Patient Vitals for the past 8 hrs:   BP Temp Pulse Resp SpO2   22 0748 111/70 98.3 °F (36.8 °C) 84 17 99 %   22 0253 119/76 -- 82 -- --     Temp (24hrs), Av.3 °F (36.8 °C), Min:98.2 °F (36.8 °C), Max:98.3 °F (36.8 °C)      Exam:  Patient without distress. Abdomen soft, fundus firm,  nontender               Perineum with normal lochia noted. Lower extremities are negative for swelling, cords or tenderness. Labs:   Recent Results (from the past 24 hour(s))   CBC W/O DIFF    Collection Time: 22  3:06 AM   Result Value Ref Range    WBC 10.1 3.6 - 11.0 K/uL    RBC 3.23 (L) 3.80 - 5.20 M/uL    HGB 9.4 (L) 11.5 - 16.0 g/dL    HCT 28.2 (L) 35.0 - 47.0 %    MCV 87.3 80.0 - 99.0 FL    MCH 29.1 26.0 - 34.0 PG    MCHC 33.3 30.0 - 36.5 g/dL    RDW 13.1 11.5 - 14.5 %    PLATELET 798 196 - 516 K/uL    MPV 9.5 8.9 - 12.9 FL    NRBC 0.0 0  WBC    ABSOLUTE NRBC 0.00 0.00 - 0.01 K/uL       Assessment:     Status post vaginal delivery. Doing well postpartum day 1. Gestational hypertension -- Bps improved since delivery.     Plan:     Routine postpartum care    Hold Labetalol for now

## 2022-08-02 NOTE — LACTATION NOTE
This note was copied from a baby's chart. Experienced breastfeeding mother. She breast fed her first baby for 6 months. Baby was transferred out of NICU today - he was born at 36.5 weeks. Mother is doing a combination of breastfeeding, pumping and formula feeding. She has 2 pumps for home use. Discussed with mother her plan for feeding. Reviewed the benefits of exclusive breast milk feeding during the hospital stay. Informed her of the risks of using formula to supplement in the first few days of life as well as the benefits of successful breast milk feeding; referred her to the Breastfeeding booklet about this information. She acknowledges understanding of information reviewed and states that it is her plan to breast/formula feed her infant. Will support her choice and offer additional information as needed. Discussed eating a healthy diet. Instructed mother to eat a variety of foods in order to get a well balanced diet. She should consume an extra 500 calories per day (more than her non-pregnant requirement.) These extra calories will help provide energy needed for optimal breast milk production. Mother also encouraged to \"drink to thirst\" and it is recommended that she drink fluids such as water, fruit/vegetable juice. Nutritious snacks should be available so that she can eat throughout the day to help satisfy her hunger and maintain a good milk supply. Pumping:  Guidelines for pumping, milk collection and storage, proper cleaning of pump parts all reviewed. How to establish and maintain breast milk supply through pumping reviewed. Differences between hospital grade rental pumps vs store bought double electric/hand pumps discussed. Set up pumping with double electric set up. List of area pump rental locations and lactation support services provided.      Mother will successfully establish breastfeeding by feeding in response to early feeding cues   or wake every 3h, will obtain deep latch, and will keep log of feedings/output. Taught to BF at hunger cues and or q 2-3 hrs and to offer 10-20 drops of hand expressed colostrum at any non-feeds. Breast Assessment  Left Breast: Medium  Left Nipple: Everted, Intact  Right Breast: Medium  Right Nipple: Everted, Intact  Breast- Feeding Assessment  Attends Breast-Feeding Classes: No  Breast-Feeding Experience: Yes (Breast fed 1st baby x 6 months)  Breast Trauma/Surgery: No  Type/Quality: Good (Mother is doing a combination of breastfeeding/pumping and fomula feeding. She is pumping because baby was in NICU until today. She is getting between 3-10 ml of expressed breast milk.)  Lactation Consultant Visits  Breast-Feedings: Good  (Mother last breast fed baby at [phone] for 5 minutes then offered baby formula (he took 10 ml). )       Mother given breastfeeding handouts and LC#.

## 2022-08-02 NOTE — PROGRESS NOTES
8/2/2022  9:35 AM    CM met with MARIA T to complete initial assessment and begin discharge planning. MOB verified and confirmed demographics. MARIA T lives with Julian Mcmullen ( ), along with their 2yr old, at the address on file. MARIA T is employed and plans to take time off from work. FOJORGITO is a stay at home dad, and will be home with infant. MARIA T reports she has good family support, and feels like she has the support she needs when she returns home. MARIA T plans to breast feed baby and has pump to use at home. [facility], will provide follow up care for infant. MARIA T has car seat, bassinet/crib, clothing, bottles and all necessary supplies for baby. MARIA T has [employer], and will be adding baby to this policy. CM discussed process to add baby to insurance, MOB verbalized understanding. MARIA T denied needing WIC/Medicaid services. Baby Balwinder born on 8/1, 36w5d, infant admitted to NICU at 4hrs of life due to temperature instability. BW: 2805gr. Currently in isolette for thermal regulation. CM following for needs. Care Management Interventions  PCP Verified by CM: Yes Rafiq Cohen)  Mode of Transport at Discharge:  Other (see comment)  Transition of Care Consult (CM Consult): Discharge Planning  Support Systems: Spouse/Significant Other, Other Family Member(s)  Confirm Follow Up Transport: Family  Discharge Location  Patient Expects to be Discharged to[de-identified] Home with family assistance  Mariah Sethi

## 2022-08-03 VITALS
HEIGHT: 67 IN | SYSTOLIC BLOOD PRESSURE: 124 MMHG | DIASTOLIC BLOOD PRESSURE: 83 MMHG | TEMPERATURE: 97.9 F | RESPIRATION RATE: 16 BRPM | OXYGEN SATURATION: 98 % | BODY MASS INDEX: 30.76 KG/M2 | WEIGHT: 196 LBS | HEART RATE: 89 BPM

## 2022-08-03 PROCEDURE — 74011250637 HC RX REV CODE- 250/637: Performed by: OBSTETRICS & GYNECOLOGY

## 2022-08-03 RX ORDER — IBUPROFEN 800 MG/1
800 TABLET ORAL
Qty: 60 TABLET | Refills: 0 | Status: SHIPPED | OUTPATIENT
Start: 2022-08-03 | End: 2022-09-02

## 2022-08-03 RX ADMIN — IBUPROFEN 800 MG: 800 TABLET, FILM COATED ORAL at 07:39

## 2022-08-03 RX ADMIN — ACETAMINOPHEN 650 MG: 325 TABLET ORAL at 07:39

## 2022-08-03 RX ADMIN — Medication 1 TABLET: at 07:39

## 2022-08-03 RX ADMIN — ACETAMINOPHEN 650 MG: 325 TABLET ORAL at 03:40

## 2022-08-03 NOTE — DISCHARGE INSTRUCTIONS
POST VAGINAL DELIVERY DISCHARGE INSTRUCTIONS    Name: Arsenio Roblero  YOB: 1990  Primary Diagnosis: Principal Problem:    Gestational hypertension, third trimester (6/27/2022)    Active Problems:    Single umbilical artery (1/14/8307)      36 weeks gestation of pregnancy (8/1/2022)        General:     Diet/Diet Restrictions:  Drink plenty of fluids daily (water, juices); avoid excessive caffeine intake. Eat and drink your normal diet. Medications:   See list    Physical Activity / Restrictions / Safety:     Avoid heavy lifting, no more that 15 lbs. For 2-3 weeks; may use of stairs with assistance first few times. No driving until no pain and off pain meds with narcotics. Avoid intercourse 4-6 weeks, no douching or tampon use. You may walk but check with obstetrician before starting or resuming an exercise program.         Discharge Instructions/Special Treatment/Home Care Needs:     Continue prenatal vitamins. Continue to use squirt bottle with warm water on your episiotomy for comfort after each bathroom use as desired. Call the office for the following:     Fever over 101 degrees by mouth. Vaginal bleeding heavier than a normal menstrual period or clots larger than a golf ball. Red streaks or increased swelling of legs, painful red streaks on your breast.  Painful urination, constipation and increased pain or swelling or discharge with your incision. Pain Management:     Pain Management:   Take Acetaminophen (Tylenol) or Ibuprofen (Advil, Motrin), as directed for pain. Use a warm Sitz bath 3 times daily to relieve episiotomy or hemorrhoidal discomfort. For hemorrhoidal discomfort, use Tucks and Anusol cream as needed and directed.     Follow-Up Care:     Appointment with MD:   Follow-up Appointments   Procedures    FOLLOW UP VISIT Appointment in: 6 Weeks Post partum     Post partum     Standing Status:   Standing     Number of Occurrences:   1     Order Specific Question: Appointment in     Answer:   6 Weeks     Telephone number: [phone]      Signed By: Alex Davis MD                                                                                                   Date: 8/3/2022 Time: 8:46 AM

## 2022-08-03 NOTE — ROUTINE PROCESS
Bedside and verbal shift change report given to oncoming nurse, as assigned, by offgoing nurse, Precious Lemus RN. Report included SBAR, Kardex, I&Os, Recent Results, Procedures, MAR, and changes in patient status. Oncoming nurse and patient given opportunity for questions.

## 2022-08-03 NOTE — DISCHARGE SUMMARY
Obstetrical Discharge Summary     Name: Betty Scruggs MRN: [id number]  SSN: [id number]    YOB: 1990  Age: 28 y.o. Sex: female      Admit Date: 2022    Discharge Date: 8/3/2022     Admitting Physician: Loren Gonzalez MD     Attending Physician:  Montserrat Leblanc MD     Admission Diagnoses: Pregnancy [Z34.90]    Discharge Diagnoses:   Information for the patient's :  Juliette Barber Male Jimmie Carballo [[id number]]   Delivery of a 6 lb 2.9 oz (2.805 kg) male infant via Vaginal, Spontaneous on 2022 at 1:42 PM  by James Genre Ii. Apgars were 9  and 9 . Additional Diagnoses:   Hospital Problems  Date Reviewed: 2022            Codes Class Noted POA    36 weeks gestation of pregnancy ICD-10-CM: Z3A.36  ICD-9-CM: V22.2  2022 Unknown        * (Principal) Gestational hypertension, third trimester ICD-10-CM: O13.3  ICD-9-CM: 642.33  2022 Yes        Single umbilical artery GDS-63-BX: Q27.0  ICD-9-CM: 747.5  2022 Yes          Lab Results   Component Value Date/Time    Rubella, External Immune 02/10/2022 12:00 AM       Immunization(s):   Immunization History   Administered Date(s) Administered    Influenza Vaccine 2022    Tdap 2022        Hospital Course: Normal hospital course following the delivery. She was able to discontinue her BP meds. The patient was released to her home in good condition. Patient Instructions:     Reference my discharge instructions. I spent 10 minutes discharging the patient in face to face contact.       Follow-up Appointments   Procedures    FOLLOW UP VISIT Appointment in: 6 Weeks Post partum     Post partum     Standing Status:   Standing     Number of Occurrences:   1     Order Specific Question:   Appointment in     Answer:   6 Weeks        Signed By:  Loren Gonzalez MD     August 3, 2022

## 2022-08-03 NOTE — PROGRESS NOTES
Pt discharged home. Discharge instructions and education completed and patient reported she had no more questions. Bands verified on patients and infant, see footprint sheet. Infant placed in car seat by parent. Prescriptions sent to pharmacy. No s/s of distress upon d/c.

## 2022-08-03 NOTE — LACTATION NOTE
This note was copied from a baby's chart. Mother and baby for discharge today. Mother states baby has been breastfeeding well. LC discussed the following:    Reviewed breastfeeding basics:  Supply and demand, breastfeed baby 8-12 times in 24 hours, feed baby on demand,  stomach size, early  Feeding cues, skin to skin, positioning and baby led latch-on, assymetrical latch with signs of good, deep latch vs shallow, feeding frequency and duration, and log sheet for tracking infant feedings and output. Breastfeeding Booklet and Warm line information given. Discussed typical  weight loss and the importance of infant weight checks with pediatrician 1-2 post discharge. Discussed eating a healthy diet. Instructed mother to eat a variety of foods in order to get a well balanced diet. She should consume an extra 500 calories per day (more than her non-pregnant requirement.) These extra calories will help provide energy needed for optimal breast milk production. Mother also encouraged to \"drink to thirst\" and it is recommended that she drink fluids such as water, fruit/vegetable juice. Nutritious snacks should be available so that she can eat throughout the day to help satisfy her hunger and maintain a good milk supply. Discussed pumping/storage and preparation of expressed breast milk for baby. Reviewed what to do if she gets engorged or nipples become sore:  Engorgement Care Guidelines:  Reviewed how milk is made and normal phases of milk production. Taught care of engorged breasts - frequent breastfeeding encouraged, warm compress before feedings and cool packs afterwards - motrin for discomfort. Anticipatory guidance shared.       Care for sore/tender nipples discussed:  ways to improve positioning and latch practiced and discussed, hand express colostrum after feedings and let air dry, light application of lanolin, hydrogel pads, seek comfortable laid back feeding position, start feedings on least sore side first.     Pt will successfully establish breastfeeding by feeding in response to early feeding cues   or wake every 3h, will obtain deep latch, and will keep log of feedings/output. Taught to BF at hunger cues and or q 2-3 hrs and to offer 10-20 drops of hand expressed colostrum at any non-feeds. Breast Assessment  Left Breast: Medium  Left Nipple: Everted, Intact  Right Breast: Medium  Right Nipple: Everted, Intact  Breast- Feeding Assessment  Attends Breast-Feeding Classes: No  Breast-Feeding Experience: Yes (Breast fed 1st baby x 6 months)  Breast Trauma/Surgery: No  Type/Quality: Good (Mother states baby has been latching on and breastfeeding well. Baby was circumcised this am and sleepy post procedure - mother hand expressed 20 drops of colostrum for baby.)  Lactation Consultant Visits  Breast-Feedings:  (Baby last breast fed at 0830 for 14 minutes. He was then taken for circumcision - mother tried to breastfeed baby after procedure but baby too sleepy so she hand expressed 20 drops of colostrum for baby. Encouraged mother to call [facility] for assistance.)     Chart shows numerous feedings, void, stool WNL. Discussed importance of monitoring outputs and feedings on first week of life. Discussed ways to tell if baby is  getting enough breast milk, ie  voids and stools, change in color of stool, and return to birth wt within 2 weeks. Follow up with pediatrician visit for weight check in 1-2 days (per AAP guidelines.)  Encouraged to call Warm Line  [phone]  for any questions/problems that arise.  Mother also given breastfeeding support group dates and times for any future needs

## 2022-08-03 NOTE — PROGRESS NOTES
Post-Partum Day Number 2    Progress note post vaginal delivery    Patient doing well post-partum without significant complaint. Voiding without difficulty, normal lochia, positive flatus. Vitals:  Patient Vitals for the past 8 hrs:   BP Temp Pulse Resp SpO2   22 0749 124/83 97.9 °F (36.6 °C) 89 16 98 %     Temp (24hrs), Av.9 °F (36.6 °C), Min:97.9 °F (36.6 °C), Max:98 °F (36.7 °C)      Vital signs stable, afebrile. Exam:  Patient without distress. Abdomen soft, fundus firm,  nontender               Perineum with normal lochia noted. Lower extremities are negative for swelling, cords or tenderness. Labs: No results found for this or any previous visit (from the past 24 hour(s)). Assessment:     Status post: vaginal delivery. Doing well postpartum day 2. Plan:     Routine postpartum care. Plan discharge for today with follow up in our office in 6 weeks. See discharge summary.

## 2022-08-23 ENCOUNTER — TELEPHONE (OUTPATIENT)
Dept: OBGYN CLINIC | Age: 32
End: 2022-08-23

## 2022-08-23 NOTE — TELEPHONE ENCOUNTER
Called pt verified name and . Advised pt per dr. Amanda Johnson ok to go swimming.  Pt verbalized understanding

## 2022-08-23 NOTE — TELEPHONE ENCOUNTER
Pt called left voice mail. Name and  [place] she is 3 weeks post partum. Can she swim in the lake ? She is done bleeding .     Please advise

## 2024-05-04 NOTE — ANESTHESIA PREPROCEDURE EVALUATION
Relevant Problems   No relevant active problems       Anesthetic History   No history of anesthetic complications            Review of Systems / Medical History  Patient summary reviewed, nursing notes reviewed and pertinent labs reviewed    Pulmonary  Within defined limits                 Neuro/Psych   Within defined limits           Cardiovascular  Within defined limits  Hypertension: well controlled              Exercise tolerance: >4 METS     GI/Hepatic/Renal  Within defined limits              Endo/Other  Within defined limits           Other Findings              Physical Exam    Airway  Mallampati: II  TM Distance: 4 - 6 cm  Neck ROM: normal range of motion   Mouth opening: Normal     Cardiovascular  Regular rate and rhythm,  S1 and S2 normal,  no murmur, click, rub, or gallop  Rhythm: regular  Rate: normal         Dental  No notable dental hx       Pulmonary  Breath sounds clear to auscultation               Abdominal  GI exam deferred       Other Findings            Anesthetic Plan    ASA: 2  Anesthesia type: epidural            Anesthetic plan and risks discussed with: Patient
<-- Click to add NO significant Past Surgical History